# Patient Record
Sex: FEMALE | Race: WHITE | Employment: FULL TIME | URBAN - METROPOLITAN AREA
[De-identification: names, ages, dates, MRNs, and addresses within clinical notes are randomized per-mention and may not be internally consistent; named-entity substitution may affect disease eponyms.]

---

## 2017-04-06 ENCOUNTER — OFFICE VISIT (OUTPATIENT)
Dept: INTERNAL MEDICINE CLINIC | Age: 57
End: 2017-04-06
Payer: COMMERCIAL

## 2017-04-06 VITALS
WEIGHT: 233 LBS | DIASTOLIC BLOOD PRESSURE: 88 MMHG | BODY MASS INDEX: 37.45 KG/M2 | HEIGHT: 66 IN | SYSTOLIC BLOOD PRESSURE: 138 MMHG

## 2017-04-06 DIAGNOSIS — Z00.00 HEALTH CARE MAINTENANCE: ICD-10-CM

## 2017-04-06 DIAGNOSIS — Z12.39 BREAST CANCER SCREENING: ICD-10-CM

## 2017-04-06 DIAGNOSIS — R79.89 ELEVATED LFTS: ICD-10-CM

## 2017-04-06 DIAGNOSIS — Z83.438 FAMILY HISTORY OF HYPERLIPIDEMIA: ICD-10-CM

## 2017-04-06 DIAGNOSIS — R06.83 SNORING: Primary | ICD-10-CM

## 2017-04-06 DIAGNOSIS — E78.2 MIXED HYPERLIPIDEMIA: ICD-10-CM

## 2017-04-06 PROCEDURE — 99386 PREV VISIT NEW AGE 40-64: CPT | Performed by: INTERNAL MEDICINE

## 2017-04-19 ENCOUNTER — HOSPITAL ENCOUNTER (OUTPATIENT)
Dept: WOMENS IMAGING | Age: 57
Discharge: HOME | End: 2017-04-19
Payer: COMMERCIAL

## 2017-04-19 ENCOUNTER — OFFICE VISIT (OUTPATIENT)
Dept: INTERNAL MEDICINE CLINIC | Age: 57
End: 2017-04-19
Payer: COMMERCIAL

## 2017-04-19 ENCOUNTER — HOSPITAL ENCOUNTER (OUTPATIENT)
Age: 57
Setting detail: SPECIMEN
Discharge: HOME | End: 2017-04-19
Payer: COMMERCIAL

## 2017-04-19 VITALS
WEIGHT: 234 LBS | HEART RATE: 82 BPM | DIASTOLIC BLOOD PRESSURE: 76 MMHG | BODY MASS INDEX: 37.77 KG/M2 | SYSTOLIC BLOOD PRESSURE: 118 MMHG

## 2017-04-19 DIAGNOSIS — Z12.4 CERVICAL CANCER SCREENING: Primary | ICD-10-CM

## 2017-04-19 DIAGNOSIS — K76.89 LIVER CYST: ICD-10-CM

## 2017-04-19 DIAGNOSIS — Z12.39 BREAST CANCER SCREENING: ICD-10-CM

## 2017-04-19 PROCEDURE — 77063 BREAST TOMOSYNTHESIS BI: CPT

## 2017-04-19 PROCEDURE — 99396 PREV VISIT EST AGE 40-64: CPT | Performed by: NURSE PRACTITIONER

## 2017-04-24 LAB — CYTOLOGY REPORT: NORMAL

## 2017-05-25 ENCOUNTER — HOSPITAL ENCOUNTER (OUTPATIENT)
Age: 57
Setting detail: SPECIMEN
Discharge: HOME | End: 2017-05-25
Payer: COMMERCIAL

## 2017-05-25 DIAGNOSIS — Z83.438 FAMILY HISTORY OF HYPERLIPIDEMIA: ICD-10-CM

## 2017-05-25 DIAGNOSIS — Z00.00 HEALTH CARE MAINTENANCE: ICD-10-CM

## 2017-05-25 LAB
ABSOLUTE EOS #: 0.1 K/UL (ref 0–0.4)
ABSOLUTE LYMPH #: 2 K/UL (ref 1–4.8)
ABSOLUTE MONO #: 0.6 K/UL (ref 0.1–1.2)
ALBUMIN SERPL-MCNC: 4.3 G/DL (ref 3.5–5.2)
ALBUMIN/GLOBULIN RATIO: 1.4 (ref 1–2.5)
ALP BLD-CCNC: 88 U/L (ref 35–104)
ALT SERPL-CCNC: 56 U/L (ref 5–33)
ANION GAP SERPL CALCULATED.3IONS-SCNC: 14 MMOL/L (ref 9–17)
AST SERPL-CCNC: 42 U/L
BASOPHILS # BLD: 1 %
BASOPHILS ABSOLUTE: 0 K/UL (ref 0–0.2)
BILIRUB SERPL-MCNC: 0.56 MG/DL (ref 0.3–1.2)
BUN BLDV-MCNC: 19 MG/DL (ref 6–20)
BUN/CREAT BLD: ABNORMAL (ref 9–20)
CALCIUM SERPL-MCNC: 9.3 MG/DL (ref 8.6–10.4)
CHLORIDE BLD-SCNC: 102 MMOL/L (ref 98–107)
CHOLESTEROL/HDL RATIO: 4.8
CHOLESTEROL: 251 MG/DL
CO2: 27 MMOL/L (ref 20–31)
CREAT SERPL-MCNC: 0.78 MG/DL (ref 0.5–0.9)
DIFFERENTIAL TYPE: NORMAL
EOSINOPHILS RELATIVE PERCENT: 2 %
GFR AFRICAN AMERICAN: >60 ML/MIN
GFR NON-AFRICAN AMERICAN: >60 ML/MIN
GFR SERPL CREATININE-BSD FRML MDRD: ABNORMAL ML/MIN/{1.73_M2}
GFR SERPL CREATININE-BSD FRML MDRD: ABNORMAL ML/MIN/{1.73_M2}
GLUCOSE BLD-MCNC: 101 MG/DL (ref 70–99)
HCT VFR BLD CALC: 41.5 % (ref 36–46)
HDLC SERPL-MCNC: 52 MG/DL
HEMOGLOBIN: 14.1 G/DL (ref 12–16)
LDL CHOLESTEROL: 162 MG/DL (ref 0–130)
LYMPHOCYTES # BLD: 37 %
MCH RBC QN AUTO: 28.4 PG (ref 26–34)
MCHC RBC AUTO-ENTMCNC: 33.9 G/DL (ref 31–37)
MCV RBC AUTO: 83.9 FL (ref 80–100)
MONOCYTES # BLD: 12 %
PDW BLD-RTO: 14.2 % (ref 12.5–15.4)
PLATELET # BLD: 263 K/UL (ref 140–450)
PLATELET ESTIMATE: NORMAL
PMV BLD AUTO: 8.6 FL (ref 6–12)
POTASSIUM SERPL-SCNC: 3.8 MMOL/L (ref 3.7–5.3)
RBC # BLD: 4.95 M/UL (ref 4–5.2)
RBC # BLD: NORMAL 10*6/UL
SEG NEUTROPHILS: 48 %
SEGMENTED NEUTROPHILS ABSOLUTE COUNT: 2.6 K/UL (ref 1.8–7.7)
SODIUM BLD-SCNC: 143 MMOL/L (ref 135–144)
TOTAL PROTEIN: 7.3 G/DL (ref 6.4–8.3)
TRIGL SERPL-MCNC: 183 MG/DL
VLDLC SERPL CALC-MCNC: ABNORMAL MG/DL (ref 1–30)
WBC # BLD: 5.4 K/UL (ref 3.5–11)
WBC # BLD: NORMAL 10*3/UL

## 2017-05-25 RX ORDER — ATORVASTATIN CALCIUM 20 MG/1
20 TABLET, FILM COATED ORAL DAILY
Qty: 30 TABLET | Refills: 3 | Status: SHIPPED | OUTPATIENT
Start: 2017-05-25

## 2017-05-30 ENCOUNTER — HOSPITAL ENCOUNTER (OUTPATIENT)
Dept: ULTRASOUND IMAGING | Age: 57
Discharge: HOME | End: 2017-05-30
Payer: COMMERCIAL

## 2017-05-30 ENCOUNTER — HOSPITAL ENCOUNTER (OUTPATIENT)
Age: 57
Setting detail: SPECIMEN
Discharge: HOME | End: 2017-05-30
Payer: COMMERCIAL

## 2017-05-30 DIAGNOSIS — R79.89 ELEVATED LFTS: ICD-10-CM

## 2017-05-30 LAB
HBV SURFACE AB TITR SER: <3.5 MIU/ML
HEPATITIS B CORE TOTAL ANTIBODY: NONREACTIVE
HEPATITIS B SURFACE ANTIGEN: NONREACTIVE
HEPATITIS C ANTIBODY: NONREACTIVE

## 2017-05-30 PROCEDURE — 76705 ECHO EXAM OF ABDOMEN: CPT

## 2017-06-07 ENCOUNTER — HOSPITAL ENCOUNTER (OUTPATIENT)
Dept: MRI IMAGING | Age: 57
Discharge: HOME | End: 2017-06-07
Payer: COMMERCIAL

## 2017-06-07 DIAGNOSIS — K76.89 LIVER CYST: ICD-10-CM

## 2017-06-07 PROCEDURE — 74183 MRI ABD W/O CNTR FLWD CNTR: CPT

## 2017-06-07 PROCEDURE — A9579 GAD-BASE MR CONTRAST NOS,1ML: HCPCS | Performed by: INTERNAL MEDICINE

## 2017-06-07 PROCEDURE — 6360000004 HC RX CONTRAST MEDICATION: Performed by: INTERNAL MEDICINE

## 2017-06-07 RX ADMIN — GADOPENTETATE DIMEGLUMINE 20 ML: 469.01 INJECTION INTRAVENOUS at 09:13

## 2017-06-13 ENCOUNTER — HOSPITAL ENCOUNTER (OUTPATIENT)
Dept: SLEEP CENTER | Age: 57
Discharge: HOME | End: 2017-06-13
Payer: COMMERCIAL

## 2017-06-13 VITALS
SYSTOLIC BLOOD PRESSURE: 138 MMHG | DIASTOLIC BLOOD PRESSURE: 88 MMHG | WEIGHT: 233 LBS | HEART RATE: 80 BPM | RESPIRATION RATE: 16 BRPM | HEIGHT: 66 IN | BODY MASS INDEX: 37.45 KG/M2

## 2017-06-13 DIAGNOSIS — G47.33 OSA (OBSTRUCTIVE SLEEP APNEA): Primary | ICD-10-CM

## 2017-06-13 PROCEDURE — 95810 POLYSOM 6/> YRS 4/> PARAM: CPT

## 2017-06-14 ASSESSMENT — SLEEP AND FATIGUE QUESTIONNAIRES
HOW LIKELY ARE YOU TO NOD OFF OR FALL ASLEEP WHILE LYING DOWN TO REST IN THE AFTERNOON WHEN CIRCUMSTANCES PERMIT: 1
HOW LIKELY ARE YOU TO NOD OFF OR FALL ASLEEP WHILE SITTING INACTIVE IN A PUBLIC PLACE: 0
HOW LIKELY ARE YOU TO NOD OFF OR FALL ASLEEP WHILE WATCHING TV: 0
ESS TOTAL SCORE: 2
HOW LIKELY ARE YOU TO NOD OFF OR FALL ASLEEP WHILE SITTING AND READING: 0
HOW LIKELY ARE YOU TO NOD OFF OR FALL ASLEEP IN A CAR, WHILE STOPPED FOR A FEW MINUTES IN TRAFFIC: 0
NECK CIRCUMFERENCE (INCHES): 14.96
HOW LIKELY ARE YOU TO NOD OFF OR FALL ASLEEP WHILE SITTING AND TALKING TO SOMEONE: 0
HOW LIKELY ARE YOU TO NOD OFF OR FALL ASLEEP WHILE SITTING QUIETLY AFTER LUNCH WITHOUT ALCOHOL: 0
HOW LIKELY ARE YOU TO NOD OFF OR FALL ASLEEP WHEN YOU ARE A PASSENGER IN A CAR FOR AN HOUR WITHOUT A BREAK: 1

## 2017-06-16 ENCOUNTER — TELEPHONE (OUTPATIENT)
Dept: INTERNAL MEDICINE CLINIC | Age: 57
End: 2017-06-16

## 2017-06-19 ENCOUNTER — TELEPHONE (OUTPATIENT)
Dept: INTERNAL MEDICINE CLINIC | Age: 57
End: 2017-06-19

## 2017-06-19 DIAGNOSIS — R06.83 SNORING: ICD-10-CM

## 2017-06-19 DIAGNOSIS — R06.83 SNORING: Primary | ICD-10-CM

## 2017-06-27 ENCOUNTER — HOSPITAL ENCOUNTER (OUTPATIENT)
Dept: SLEEP CENTER | Age: 57
Discharge: HOME | End: 2017-06-27
Payer: COMMERCIAL

## 2017-06-27 DIAGNOSIS — G47.33 OSA (OBSTRUCTIVE SLEEP APNEA): Primary | ICD-10-CM

## 2017-06-27 PROCEDURE — 95811 POLYSOM 6/>YRS CPAP 4/> PARM: CPT

## 2017-07-27 DIAGNOSIS — R06.83 SNORING: ICD-10-CM

## 2021-02-17 ENCOUNTER — OFFICE VISIT (OUTPATIENT)
Dept: INTERNAL MEDICINE CLINIC | Age: 61
End: 2021-02-17
Payer: COMMERCIAL

## 2021-02-17 ENCOUNTER — HOSPITAL ENCOUNTER (OUTPATIENT)
Age: 61
Setting detail: SPECIMEN
Discharge: HOME | End: 2021-02-17
Payer: COMMERCIAL

## 2021-02-17 VITALS
DIASTOLIC BLOOD PRESSURE: 82 MMHG | HEART RATE: 75 BPM | HEIGHT: 66 IN | OXYGEN SATURATION: 98 % | SYSTOLIC BLOOD PRESSURE: 138 MMHG | TEMPERATURE: 97.1 F | WEIGHT: 230 LBS | BODY MASS INDEX: 36.96 KG/M2

## 2021-02-17 DIAGNOSIS — Z12.31 ENCOUNTER FOR SCREENING MAMMOGRAM FOR BREAST CANCER: ICD-10-CM

## 2021-02-17 DIAGNOSIS — E66.09 CLASS 2 OBESITY DUE TO EXCESS CALORIES WITHOUT SERIOUS COMORBIDITY WITH BODY MASS INDEX (BMI) OF 37.0 TO 37.9 IN ADULT: ICD-10-CM

## 2021-02-17 DIAGNOSIS — Z00.00 ENCOUNTER FOR MEDICAL EXAMINATION TO ESTABLISH CARE: ICD-10-CM

## 2021-02-17 DIAGNOSIS — Z13.220 SCREENING FOR HYPERLIPIDEMIA: ICD-10-CM

## 2021-02-17 DIAGNOSIS — E55.9 VITAMIN D DEFICIENCY: ICD-10-CM

## 2021-02-17 DIAGNOSIS — Z12.11 SCREENING FOR COLON CANCER: Primary | ICD-10-CM

## 2021-02-17 PROBLEM — E66.812 CLASS 2 OBESITY DUE TO EXCESS CALORIES WITHOUT SERIOUS COMORBIDITY WITH BODY MASS INDEX (BMI) OF 37.0 TO 37.9 IN ADULT: Status: ACTIVE | Noted: 2021-02-17

## 2021-02-17 LAB
ABSOLUTE EOS #: 0.05 K/UL (ref 0–0.44)
ABSOLUTE IMMATURE GRANULOCYTE: <0.03 K/UL (ref 0–0.3)
ABSOLUTE LYMPH #: 1.9 K/UL (ref 1.1–3.7)
ABSOLUTE MONO #: 0.48 K/UL (ref 0.1–1.2)
ALBUMIN SERPL-MCNC: 4.5 G/DL (ref 3.5–5.2)
ALBUMIN/GLOBULIN RATIO: 1.8 (ref 1–2.5)
ALP BLD-CCNC: 73 U/L (ref 35–104)
ALT SERPL-CCNC: 49 U/L (ref 5–33)
ANION GAP SERPL CALCULATED.3IONS-SCNC: 12 MMOL/L (ref 9–17)
AST SERPL-CCNC: 29 U/L
BASOPHILS # BLD: 0 % (ref 0–2)
BASOPHILS ABSOLUTE: <0.03 K/UL (ref 0–0.2)
BILIRUB SERPL-MCNC: 0.42 MG/DL (ref 0.3–1.2)
BILIRUBIN URINE: NEGATIVE
BUN BLDV-MCNC: 18 MG/DL (ref 8–23)
BUN/CREAT BLD: ABNORMAL (ref 9–20)
CALCIUM SERPL-MCNC: 10.3 MG/DL (ref 8.6–10.4)
CHLORIDE BLD-SCNC: 104 MMOL/L (ref 98–107)
CHOLESTEROL, FASTING: 234 MG/DL
CHOLESTEROL/HDL RATIO: 4.5
CO2: 26 MMOL/L (ref 20–31)
COLOR: YELLOW
COMMENT UA: NORMAL
CREAT SERPL-MCNC: 0.9 MG/DL (ref 0.5–0.9)
DIFFERENTIAL TYPE: NORMAL
EOSINOPHILS RELATIVE PERCENT: 1 % (ref 1–4)
GFR AFRICAN AMERICAN: >60 ML/MIN
GFR NON-AFRICAN AMERICAN: >60 ML/MIN
GFR SERPL CREATININE-BSD FRML MDRD: ABNORMAL ML/MIN/{1.73_M2}
GFR SERPL CREATININE-BSD FRML MDRD: ABNORMAL ML/MIN/{1.73_M2}
GLUCOSE BLD-MCNC: 100 MG/DL (ref 70–99)
GLUCOSE URINE: NEGATIVE
HCT VFR BLD CALC: 42.4 % (ref 36.3–47.1)
HDLC SERPL-MCNC: 52 MG/DL
HEMOGLOBIN: 13.8 G/DL (ref 11.9–15.1)
IMMATURE GRANULOCYTES: 0 %
KETONES, URINE: NEGATIVE
LDL CHOLESTEROL: 154 MG/DL (ref 0–130)
LEUKOCYTE ESTERASE, URINE: NEGATIVE
LYMPHOCYTES # BLD: 35 % (ref 24–43)
MCH RBC QN AUTO: 27.9 PG (ref 25.2–33.5)
MCHC RBC AUTO-ENTMCNC: 32.5 G/DL (ref 28.4–34.8)
MCV RBC AUTO: 85.7 FL (ref 82.6–102.9)
MONOCYTES # BLD: 9 % (ref 3–12)
NITRITE, URINE: NEGATIVE
NRBC AUTOMATED: 0 PER 100 WBC
PDW BLD-RTO: 13.2 % (ref 11.8–14.4)
PH UA: 5 (ref 5–8)
PLATELET # BLD: 263 K/UL (ref 138–453)
PLATELET ESTIMATE: NORMAL
PMV BLD AUTO: 11 FL (ref 8.1–13.5)
POTASSIUM SERPL-SCNC: 4.1 MMOL/L (ref 3.7–5.3)
PROTEIN UA: NEGATIVE
RBC # BLD: 4.95 M/UL (ref 3.95–5.11)
RBC # BLD: NORMAL 10*6/UL
SEG NEUTROPHILS: 55 % (ref 36–65)
SEGMENTED NEUTROPHILS ABSOLUTE COUNT: 2.9 K/UL (ref 1.5–8.1)
SODIUM BLD-SCNC: 142 MMOL/L (ref 135–144)
SPECIFIC GRAVITY UA: 1.01 (ref 1–1.03)
TOTAL PROTEIN: 7 G/DL (ref 6.4–8.3)
TRIGLYCERIDE, FASTING: 140 MG/DL
TSH SERPL DL<=0.05 MIU/L-ACNC: 2.17 MIU/L (ref 0.3–5)
TURBIDITY: CLEAR
URINE HGB: NEGATIVE
UROBILINOGEN, URINE: NORMAL
VITAMIN D 25-HYDROXY: 47.3 NG/ML (ref 30–100)
VLDLC SERPL CALC-MCNC: ABNORMAL MG/DL (ref 1–30)
WBC # BLD: 5.4 K/UL (ref 3.5–11.3)
WBC # BLD: NORMAL 10*3/UL

## 2021-02-17 PROCEDURE — 99203 OFFICE O/P NEW LOW 30 MIN: CPT | Performed by: NURSE PRACTITIONER

## 2021-02-17 ASSESSMENT — ENCOUNTER SYMPTOMS
COUGH: 0
CHEST TIGHTNESS: 0
DIARRHEA: 0
CONSTIPATION: 0
RHINORRHEA: 0
VOMITING: 0
NAUSEA: 0
WHEEZING: 0
TROUBLE SWALLOWING: 0
COLOR CHANGE: 0
SHORTNESS OF BREATH: 0
SORE THROAT: 0
ABDOMINAL PAIN: 0

## 2021-02-17 ASSESSMENT — PATIENT HEALTH QUESTIONNAIRE - PHQ9
SUM OF ALL RESPONSES TO PHQ QUESTIONS 1-9: 0
2. FEELING DOWN, DEPRESSED OR HOPELESS: 0
SUM OF ALL RESPONSES TO PHQ QUESTIONS 1-9: 0
SUM OF ALL RESPONSES TO PHQ QUESTIONS 1-9: 0

## 2021-02-17 NOTE — PROGRESS NOTES
Talks on phone: Not on file     Gets together: Not on file     Attends Zoroastrianism service: Not on file     Active member of club or organization: Not on file     Attends meetings of clubs or organizations: Not on file     Relationship status: Not on file    Intimate partner violence     Fear of current or ex partner: Not on file     Emotionally abused: Not on file     Physically abused: Not on file     Forced sexual activity: Not on file   Other Topics Concern    Not on file   Social History Narrative    Not on file         REVIEW OF SYSTEMS:   Review of Systems   Constitutional: Negative for chills, diaphoresis, fatigue, fever and unexpected weight change. HENT: Negative for congestion, postnasal drip, rhinorrhea, sneezing, sore throat and trouble swallowing. Eyes: Negative for visual disturbance. Respiratory: Negative for cough, chest tightness, shortness of breath and wheezing. Cardiovascular: Negative for chest pain. Gastrointestinal: Negative for abdominal pain, constipation, diarrhea, nausea and vomiting. Endocrine: Negative for polydipsia, polyphagia and polyuria. Genitourinary: Negative for difficulty urinating, dysuria, flank pain, frequency and urgency. Musculoskeletal: Negative for arthralgias. Skin: Negative for color change and rash. Neurological: Negative for dizziness, tremors, syncope, weakness and numbness. Psychiatric/Behavioral: Negative for confusion, hallucinations, self-injury, sleep disturbance and suicidal ideas. The patient is not nervous/anxious. PHYSICAL EXAM:      Vitals:    02/17/21 0935 02/17/21 1007   BP: (!) 146/82 138/82   Site: Right Upper Arm Left Upper Arm   Position: Sitting    Pulse: 75    Temp: 97.1 °F (36.2 °C)    SpO2: 98%    Weight: 230 lb (104.3 kg)    Height: 5' 6\" (1.676 m)        Physical Exam  Vitals signs reviewed. Constitutional:       Appearance: Normal appearance. She is obese. HENT:      Head: Normocephalic. Cardiovascular:      Rate and Rhythm: Normal rate and regular rhythm. Pulses: Normal pulses. Heart sounds: Normal heart sounds. Pulmonary:      Effort: Pulmonary effort is normal.      Breath sounds: Normal breath sounds. Abdominal:      General: Bowel sounds are normal.      Palpations: Abdomen is soft. Musculoskeletal: Normal range of motion. General: No swelling, tenderness or deformity. Skin:     General: Skin is warm and dry. Neurological:      General: No focal deficit present. Mental Status: She is alert and oriented to person, place, and time. Psychiatric:         Mood and Affect: Mood normal.         Behavior: Behavior normal.         Thought Content: Thought content normal.         Judgment: Judgment normal.          LABORATORY FINDINGS:    CBC:   Lab Results   Component Value Date    WBC 5.4 05/25/2017    HGB 14.1 05/25/2017     05/25/2017     BMP:    Lab Results   Component Value Date     05/25/2017    K 3.8 05/25/2017     05/25/2017    CO2 27 05/25/2017    BUN 19 05/25/2017    CREATININE 0.78 05/25/2017    GLUCOSE 101 05/25/2017     Hemoglobin A1C: No results found for: LABA1C  Lipid profile:   Lab Results   Component Value Date    CHOL 251 05/25/2017    TRIG 183 05/25/2017    HDL 52 05/25/2017     Thyroid functions: No results found for: TSH   Hepatic functions:   Lab Results   Component Value Date    ALT 56 05/25/2017    AST 42 05/25/2017    PROT 7.3 05/25/2017    BILITOT 0.56 05/25/2017    LABALBU 4.3 05/25/2017       ASSESSMENT AND PLAN:     1. Encounter for medical examination to establish care  - CBC With Auto Differential; Future  - Cologuard (For External Results Only); Future  - Comprehensive Metabolic Panel; Future  - SHAYLA Digital Screen Bilateral [id number]; Future  - Lipid, Fasting; Future  - TSH without Reflex; Future  - Urinalysis; Future    2.  Encounter for screening mammogram for breast cancer

## 2021-02-19 ENCOUNTER — HOSPITAL ENCOUNTER (OUTPATIENT)
Dept: WOMENS IMAGING | Age: 61
Discharge: HOME | End: 2021-02-21
Payer: COMMERCIAL

## 2021-02-19 DIAGNOSIS — Z12.31 ENCOUNTER FOR SCREENING MAMMOGRAM FOR BREAST CANCER: ICD-10-CM

## 2021-02-19 DIAGNOSIS — Z00.00 ENCOUNTER FOR MEDICAL EXAMINATION TO ESTABLISH CARE: ICD-10-CM

## 2021-02-19 PROCEDURE — 77063 BREAST TOMOSYNTHESIS BI: CPT

## 2021-02-22 DIAGNOSIS — R92.8 ABNORMAL MAMMOGRAM: Primary | ICD-10-CM

## 2021-02-25 ENCOUNTER — HOSPITAL ENCOUNTER (OUTPATIENT)
Age: 61
Setting detail: SPECIMEN
Discharge: HOME | End: 2021-02-25
Payer: COMMERCIAL

## 2021-02-25 ENCOUNTER — OFFICE VISIT (OUTPATIENT)
Dept: INTERNAL MEDICINE CLINIC | Age: 61
End: 2021-02-25
Payer: COMMERCIAL

## 2021-02-25 VITALS
WEIGHT: 224 LBS | OXYGEN SATURATION: 98 % | DIASTOLIC BLOOD PRESSURE: 80 MMHG | BODY MASS INDEX: 36 KG/M2 | HEART RATE: 89 BPM | SYSTOLIC BLOOD PRESSURE: 132 MMHG | TEMPERATURE: 97.4 F | HEIGHT: 66 IN

## 2021-02-25 DIAGNOSIS — Z01.419 WELL WOMAN EXAM WITH ROUTINE GYNECOLOGICAL EXAM: Primary | ICD-10-CM

## 2021-02-25 DIAGNOSIS — Z12.4 CERVICAL CANCER SCREENING: ICD-10-CM

## 2021-02-25 PROCEDURE — 99213 OFFICE O/P EST LOW 20 MIN: CPT | Performed by: NURSE PRACTITIONER

## 2021-02-25 ASSESSMENT — ENCOUNTER SYMPTOMS
TROUBLE SWALLOWING: 0
ABDOMINAL PAIN: 0
SORE THROAT: 0
COUGH: 0
VOMITING: 0
WHEEZING: 0
SHORTNESS OF BREATH: 0
CHEST TIGHTNESS: 0
COLOR CHANGE: 0
CONSTIPATION: 0
DIARRHEA: 0
RHINORRHEA: 0
NAUSEA: 0

## 2021-02-25 NOTE — PROGRESS NOTES
Visit Information    Have you changed or started any medications since your last visit including any over-the-counter medicines, vitamins, or herbal medicines? no   Are you having any side effects from any of your medications? -  no  Have you stopped taking any of your medications? Is so, why? -  no    Have you seen any other physician or provider since your last visit? Yes - Records Obtained  Have you had any other diagnostic tests since your last visit? Yes - Records Obtained  Have you been seen in the emergency room and/or had an admission to a hospital since we last saw you? No  Have you had your routine dental cleaning in the past 6 months? yes -     Have you activated your [employer] account? If not, what are your barriers?  Yes     Patient Care Team:  COY Viveros CNP as PCP - General (Nurse Practitioner)  COY Viveros CNP as PCP - [facility]    Medical History Review  Past Medical, Family, and Social History reviewed and does not contribute to the patient presenting condition    Health Maintenance   Topic Date Due    DTaP/Tdap/Td vaccine (1 - Tdap) 02/25/1978    Colon cancer screen colonoscopy  02/25/2009    Cervical cancer screen  04/19/2020    Lipid screen  02/17/2022    Breast cancer screen  02/19/2023    Flu vaccine  Completed    Shingles Vaccine  Completed    Hepatitis C screen  Completed    Hepatitis A vaccine  Aged Out    Hepatitis B vaccine  Aged Out    Hib vaccine  Aged Out    Meningococcal (ACWY) vaccine  Aged Out    Pneumococcal 0-64 years Vaccine  Aged Out    HIV screen  Discontinued         [address] [address]  Dept: [phone]  Dept Fax: [phone]    OfficeProgress/Follow Up Note  Date of patient's visit: 2/25/2021  Patient's Name:  Andreia Han YOB: 1960            Patient Care Team:  COY Viveros CNP as PCP - General (Nurse Practitioner) COY Pastor - CNP as PCP - [facility] Empaneled Provider    REASON FOR VISIT:Same day visit    HISTORY OF PRESENT ILLNESS:      Chief Complaint   Patient presents with    Gynecologic Exam       History was obtained from the patient. Jennifer Guardado is a 57 y.o. female here today for well woman exam. No complaints at this time. Patient Active Problem List   Diagnosis    Class 2 obesity due to excess calories without serious comorbidity with body mass index (BMI) of 37.0 to 37.9 in adult       MEDICATIONS:      Current Outpatient Medications   Medication Sig Dispense Refill    atorvastatin (LIPITOR) 20 MG tablet Take 1 tablet by mouth daily (Patient not taking: Reported on 2/17/2021) 30 tablet 3     No current facility-administered medications for this visit. ALLERGIES:    No Known Allergies    SOCIAL HISTORY   Reviewed and no change from previous record. Vedia Curling  reports that she has never smoked. She has never used smokeless tobacco.    FAMILY HISTORY:    Reviewed and No change from previous visit    REVIEW OF SYSTEMS:    Review of Systems   Constitutional: Negative for chills, diaphoresis, fatigue, fever and unexpected weight change. HENT: Negative for congestion, postnasal drip, rhinorrhea, sneezing, sore throat and trouble swallowing. Eyes: Negative for visual disturbance. Respiratory: Negative for cough, chest tightness, shortness of breath and wheezing. Cardiovascular: Negative for chest pain. Gastrointestinal: Negative for abdominal pain, constipation, diarrhea, nausea and vomiting. Endocrine: Negative for polydipsia, polyphagia and polyuria. Genitourinary: Negative for decreased urine volume, difficulty urinating, dyspareunia, dysuria, flank pain, frequency, hematuria, pelvic pain, urgency, vaginal bleeding, vaginal discharge and vaginal pain. Musculoskeletal: Negative for arthralgias. Skin: Negative for color change and rash. Neurological: Negative for dizziness, tremors, syncope, weakness and numbness. Psychiatric/Behavioral: Negative for confusion and hallucinations. PHYSICAL EXAM:      Vitals:    02/25/21 1119   BP: 132/80   Pulse: 89   Temp: 97.4 °F (36.3 °C)   SpO2: 98%   Weight: 224 lb (101.6 kg)   Height: 5' 6\" (1.676 m)       Physical Exam  Vitals signs reviewed. Exam conducted with a chaperone present. Constitutional:       Appearance: Normal appearance. HENT:      Head: Normocephalic. Cardiovascular:      Rate and Rhythm: Normal rate and regular rhythm. Pulses: Normal pulses. Heart sounds: Normal heart sounds. Pulmonary:      Effort: Pulmonary effort is normal.      Breath sounds: Normal breath sounds. Chest:      Chest wall: No mass, lacerations, deformity, swelling, tenderness, crepitus or edema. Breasts: Breasts are symmetrical.         Right: Normal.         Left: Normal.   Abdominal:      Hernia: There is no hernia in the left inguinal area or right inguinal area. Genitourinary:     General: Normal vulva. Exam position: Lithotomy position. Pubic Area: No rash or pubic lice. Labia:         Right: No rash, tenderness, lesion or injury. Left: No rash, tenderness, lesion or injury. Urethra: No prolapse, urethral pain, urethral swelling or urethral lesion. Vagina: Normal.      Cervix: Normal.      Uterus: Normal.       Adnexa: Right adnexa normal and left adnexa normal.   Musculoskeletal: Normal range of motion. Lymphadenopathy:      Upper Body:      Right upper body: No supraclavicular, axillary or pectoral adenopathy. Left upper body: No supraclavicular, axillary or pectoral adenopathy. Lower Body: No right inguinal adenopathy. No left inguinal adenopathy. Skin:     General: Skin is warm and dry. Neurological:      General: No focal deficit present. Mental Status: She is alert and oriented to person, place, and time. Psychiatric:         Mood and Affect: Mood normal.         Behavior: Behavior normal.         Thought Content: Thought content normal.         Judgment: Judgment normal.          ASSESSMENT AND PLAN:      1. Well woman exam with routine gynecological exam  -PAP smear  - additional diagnostic mammogram and ultrasound scheduled for 3/1/21 due to patients dense breast tissue. 2. Cervical cancer screening  - PAP Smear; Future      FOLLOW UP AND INSTRUCTIONS:   No follow-ups on file. Discussed use, benefit, and side effects of prescribed medications. All patient questions answered. Patient voiced understanding. Patient given educational materials - see patient instructions    COY Rojas - CNP   ELYSIA. [facility]  2/25/2021, 1:24 PM    Please note that this chart wasgenerated using voice recognition Dragon dictation software. Although every effort was made to ensure the accuracy of this automated transcription, some errors in transcription may have occurred.

## 2021-02-27 LAB
HPV SAMPLE: NORMAL
HPV, GENOTYPE 16: NOT DETECTED
HPV, GENOTYPE 18: NOT DETECTED
HPV, HIGH RISK OTHER: NOT DETECTED
HPV, INTERPRETATION: NORMAL
SPECIMEN DESCRIPTION: NORMAL

## 2021-03-01 ENCOUNTER — HOSPITAL ENCOUNTER (OUTPATIENT)
Dept: WOMENS IMAGING | Age: 61
Discharge: HOME | End: 2021-03-03
Payer: COMMERCIAL

## 2021-03-01 DIAGNOSIS — R92.8 ABNORMAL MAMMOGRAM: ICD-10-CM

## 2021-03-01 DIAGNOSIS — Z12.11 SCREENING FOR COLON CANCER: ICD-10-CM

## 2021-03-01 DIAGNOSIS — Z00.00 ENCOUNTER FOR MEDICAL EXAMINATION TO ESTABLISH CARE: ICD-10-CM

## 2021-03-01 PROCEDURE — 77065 DX MAMMO INCL CAD UNI: CPT

## 2021-03-05 LAB — CYTOLOGY REPORT: NORMAL

## 2021-04-29 ENCOUNTER — TELEPHONE (OUTPATIENT)
Dept: INTERNAL MEDICINE CLINIC | Age: 61
End: 2021-04-29

## 2021-04-29 NOTE — TELEPHONE ENCOUNTER
Pt sent EOB's from her visit on 2/15/21 and also on 2/25/21 to writers e-mail asking if the coding for the visits could be reviewed. Writer forwarded the message to Carlos Elmore for her to investigate and respond back to the pt.

## 2021-04-30 NOTE — TELEPHONE ENCOUNTER
Spoke with patient there is no coding issue, issue is that insurance is not cover general exam. Patient stated she will call and speak with insurance and give an updated if we can help.

## 2023-01-09 ENCOUNTER — TELEPHONE (OUTPATIENT)
Dept: BARIATRICS/WEIGHT MGMT | Age: 63
End: 2023-01-09

## 2023-01-09 NOTE — TELEPHONE ENCOUNTER
Online Info Session Completed:  on 12/27/2022 okay to schedule with Dr. Anni Kuhn   with [address]     Patient informed the following: excluded benefit. Yves Benitez

## 2024-02-06 ENCOUNTER — TELEPHONE (OUTPATIENT)
Dept: INTERNAL MEDICINE CLINIC | Age: 64
End: 2024-02-06

## 2024-02-06 ENCOUNTER — HOSPITAL ENCOUNTER (OUTPATIENT)
Age: 64
Setting detail: SPECIMEN
Discharge: HOME | End: 2024-02-06

## 2024-02-06 ENCOUNTER — OFFICE VISIT (OUTPATIENT)
Dept: INTERNAL MEDICINE CLINIC | Age: 64
End: 2024-02-06
Payer: MEDICARE

## 2024-02-06 VITALS
SYSTOLIC BLOOD PRESSURE: 136 MMHG | BODY MASS INDEX: 34.23 KG/M2 | HEART RATE: 63 BPM | OXYGEN SATURATION: 97 % | HEIGHT: 66 IN | DIASTOLIC BLOOD PRESSURE: 98 MMHG | WEIGHT: 213 LBS

## 2024-02-06 DIAGNOSIS — R73.09 ELEVATED GLUCOSE: ICD-10-CM

## 2024-02-06 DIAGNOSIS — Z12.31 ENCOUNTER FOR SCREENING MAMMOGRAM FOR BREAST CANCER: ICD-10-CM

## 2024-02-06 DIAGNOSIS — Z13.29 SCREENING FOR ENDOCRINE DISORDER: ICD-10-CM

## 2024-02-06 DIAGNOSIS — R03.0 ELEVATED BP WITHOUT DIAGNOSIS OF HYPERTENSION: ICD-10-CM

## 2024-02-06 DIAGNOSIS — N95.0 POSTMENOPAUSAL BLEEDING: ICD-10-CM

## 2024-02-06 DIAGNOSIS — Z13.5 GLAUCOMA SCREENING: ICD-10-CM

## 2024-02-06 DIAGNOSIS — Z13.220 SCREENING FOR HYPERLIPIDEMIA: ICD-10-CM

## 2024-02-06 DIAGNOSIS — Z76.89 ENCOUNTER TO ESTABLISH CARE: Primary | ICD-10-CM

## 2024-02-06 DIAGNOSIS — Z01.00 VISION TEST: Primary | ICD-10-CM

## 2024-02-06 DIAGNOSIS — Z12.11 SCREENING FOR MALIGNANT NEOPLASM OF COLON: ICD-10-CM

## 2024-02-06 LAB
ALBUMIN SERPL-MCNC: 4.4 G/DL (ref 3.5–5.2)
ALBUMIN/GLOB SERPL: 1.8 {RATIO} (ref 1–2.5)
ALP SERPL-CCNC: 98 U/L (ref 35–104)
ALT SERPL-CCNC: 40 U/L (ref 5–33)
ANION GAP SERPL CALCULATED.3IONS-SCNC: 12 MMOL/L (ref 9–17)
AST SERPL-CCNC: 30 U/L
BACTERIA URNS QL MICRO: NORMAL
BASOPHILS # BLD: 0.04 K/UL (ref 0–0.2)
BASOPHILS NFR BLD: 1 % (ref 0–2)
BILIRUB SERPL-MCNC: 0.6 MG/DL (ref 0.3–1.2)
BILIRUB UR QL STRIP: NEGATIVE
BUN SERPL-MCNC: 18 MG/DL (ref 8–23)
CALCIUM SERPL-MCNC: 9.8 MG/DL (ref 8.6–10.4)
CASTS #/AREA URNS LPF: NORMAL /LPF (ref 0–8)
CHLORIDE SERPL-SCNC: 106 MMOL/L (ref 98–107)
CHOLEST SERPL-MCNC: 245 MG/DL
CHOLESTEROL/HDL RATIO: 4.4
CLARITY UR: CLEAR
CO2 SERPL-SCNC: 26 MMOL/L (ref 20–31)
COLOR UR: YELLOW
CREAT SERPL-MCNC: 0.8 MG/DL (ref 0.5–0.9)
EOSINOPHIL # BLD: 0.09 K/UL (ref 0–0.44)
EOSINOPHILS RELATIVE PERCENT: 1 % (ref 1–4)
EPI CELLS #/AREA URNS HPF: NORMAL /HPF (ref 0–5)
ERYTHROCYTE [DISTWIDTH] IN BLOOD BY AUTOMATED COUNT: 13.9 % (ref 11.8–14.4)
EST. AVERAGE GLUCOSE BLD GHB EST-MCNC: 105 MG/DL
GFR SERPL CREATININE-BSD FRML MDRD: >60 ML/MIN/1.73M2
GLUCOSE SERPL-MCNC: 85 MG/DL (ref 70–99)
GLUCOSE UR STRIP-MCNC: NEGATIVE MG/DL
HBA1C MFR BLD: 5.3 % (ref 4–6)
HCT VFR BLD AUTO: 41.9 % (ref 36.3–47.1)
HDLC SERPL-MCNC: 56 MG/DL
HGB BLD-MCNC: 13.5 G/DL (ref 11.9–15.1)
HGB UR QL STRIP.AUTO: ABNORMAL
IMM GRANULOCYTES # BLD AUTO: <0.03 K/UL (ref 0–0.3)
IMM GRANULOCYTES NFR BLD: 0 %
KETONES UR STRIP-MCNC: NEGATIVE MG/DL
LDLC SERPL CALC-MCNC: 165 MG/DL (ref 0–130)
LEUKOCYTE ESTERASE UR QL STRIP: ABNORMAL
LYMPHOCYTES NFR BLD: 2.01 K/UL (ref 1.1–3.7)
LYMPHOCYTES RELATIVE PERCENT: 29 % (ref 24–43)
MCH RBC QN AUTO: 28.2 PG (ref 25.2–33.5)
MCHC RBC AUTO-ENTMCNC: 32.2 G/DL (ref 28.4–34.8)
MCV RBC AUTO: 87.7 FL (ref 82.6–102.9)
MONOCYTES NFR BLD: 0.74 K/UL (ref 0.1–1.2)
MONOCYTES NFR BLD: 11 % (ref 3–12)
NEUTROPHILS NFR BLD: 58 % (ref 36–65)
NEUTS SEG NFR BLD: 4.09 K/UL (ref 1.5–8.1)
NITRITE UR QL STRIP: NEGATIVE
NRBC BLD-RTO: 0 PER 100 WBC
PH UR STRIP: 5.5 [PH] (ref 5–8)
PLATELET # BLD AUTO: 269 K/UL (ref 138–453)
PMV BLD AUTO: 10 FL (ref 8.1–13.5)
POTASSIUM SERPL-SCNC: 4.4 MMOL/L (ref 3.7–5.3)
PROT SERPL-MCNC: 6.8 G/DL (ref 6.4–8.3)
PROT UR STRIP-MCNC: NEGATIVE MG/DL
RBC # BLD AUTO: 4.78 M/UL (ref 3.95–5.11)
RBC #/AREA URNS HPF: NORMAL /HPF (ref 0–4)
SODIUM SERPL-SCNC: 144 MMOL/L (ref 135–144)
SP GR UR STRIP: 1.01 (ref 1–1.03)
TRIGL SERPL-MCNC: 118 MG/DL
TSH SERPL DL<=0.05 MIU/L-ACNC: 1.62 UIU/ML (ref 0.3–5)
UROBILINOGEN UR STRIP-ACNC: NORMAL EU/DL (ref 0–1)
WBC #/AREA URNS HPF: NORMAL /HPF (ref 0–5)
WBC OTHER # BLD: 7 K/UL (ref 3.5–11.3)

## 2024-02-06 PROCEDURE — G8427 DOCREV CUR MEDS BY ELIG CLIN: HCPCS | Performed by: NURSE PRACTITIONER

## 2024-02-06 PROCEDURE — 1036F TOBACCO NON-USER: CPT | Performed by: NURSE PRACTITIONER

## 2024-02-06 PROCEDURE — G8484 FLU IMMUNIZE NO ADMIN: HCPCS | Performed by: NURSE PRACTITIONER

## 2024-02-06 PROCEDURE — 3017F COLORECTAL CA SCREEN DOC REV: CPT | Performed by: NURSE PRACTITIONER

## 2024-02-06 PROCEDURE — 99204 OFFICE O/P NEW MOD 45 MIN: CPT | Performed by: NURSE PRACTITIONER

## 2024-02-06 PROCEDURE — G8417 CALC BMI ABV UP PARAM F/U: HCPCS | Performed by: NURSE PRACTITIONER

## 2024-02-06 RX ORDER — M-VIT,TX,IRON,MINS/CALC/FOLIC 27MG-0.4MG
1 TABLET ORAL DAILY
COMMUNITY

## 2024-02-06 NOTE — TELEPHONE ENCOUNTER
Patient called to schedule routine eye exam. She was advised to obtain a referral from PCP.     Referral pending, please add diagnosis and sign.

## 2024-02-06 NOTE — PROGRESS NOTES
Visit Information    Have you changed or started any medications since your last visit including any over-the-counter medicines, vitamins, or herbal medicines? no   Are you having any side effects from any of your medications? -  no  Have you stopped taking any of your medications? Is so, why? -  no    Have you seen any other physician or provider since your last visit? No  Have you had any other diagnostic tests since your last visit? No  Have you been seen in the emergency room and/or had an admission to a hospital since we last saw you? No  Have you had your routine dental cleaning in the past 6 months? no    Have you activated your [employer] account? If not, what are your barriers? Yes     Patient Care Team:  Anca Rangel APRN - CNP as PCP - General (Internal Medicine)  Anca Rangel APRN - CNP as PCP - Empaneled Provider    Medical History Review  Past Medical, Family, and Social History reviewed and does contribute to the patient presenting condition    Health Maintenance   Topic Date Due    DTaP/Tdap/Td vaccine (1 - Tdap) Never done    Respiratory Syncytial Virus (RSV) Pregnant or age 60 yrs+ (1 - 1-dose 60+ series) Never done    Flu vaccine (1) 08/01/2023    COVID-19 Vaccine (3 - 2023-24 season) 09/01/2023    Annual Wellness Visit (Medicare Advantage)  Never done    Colorectal Cancer Screen  02/24/2024    Depression Screen  02/06/2025    Breast cancer screen  02/07/2026    Cervical cancer screen  02/25/2026    Lipids  02/06/2029    Shingles vaccine  Completed    Hepatitis C screen  Completed    Hepatitis A vaccine  Aged Out    Hepatitis B vaccine  Aged Out    Hib vaccine  Aged Out    Polio vaccine  Aged Out    Meningococcal (ACWY) vaccine  Aged Out    Pneumococcal 0-64 years Vaccine  Aged Out    Diabetes screen  Discontinued    HIV screen  Discontinued        [facility]    New Patient Note/History and Physical    Date of patient's visit: 2/6/2024     Name:  Betsy Wells      Date of Birth:

## 2024-02-07 ENCOUNTER — HOSPITAL ENCOUNTER (OUTPATIENT)
Dept: WOMENS IMAGING | Age: 64
Discharge: HOME | End: 2024-02-09
Payer: MEDICARE

## 2024-02-07 DIAGNOSIS — Z12.31 ENCOUNTER FOR SCREENING MAMMOGRAM FOR BREAST CANCER: ICD-10-CM

## 2024-02-07 PROCEDURE — 77063 BREAST TOMOSYNTHESIS BI: CPT

## 2024-02-08 ENCOUNTER — TELEPHONE (OUTPATIENT)
Dept: INTERNAL MEDICINE CLINIC | Age: 64
End: 2024-02-08

## 2024-02-08 DIAGNOSIS — I10 HYPERTENSION GOAL BP (BLOOD PRESSURE) < 130/80: Primary | ICD-10-CM

## 2024-02-08 NOTE — TELEPHONE ENCOUNTER
Patient has been keeping track of her BP and it has been running high.    Yesterday morning 147/90                   Afternoon 152/90                    Evening 150/100    Today /101       She does think she needs a BP medication.    Elvis Harris

## 2024-02-09 RX ORDER — LISINOPRIL 10 MG/1
10 TABLET ORAL DAILY
Qty: 30 TABLET | Refills: 0 | Status: SHIPPED | OUTPATIENT
Start: 2024-02-09

## 2024-02-09 NOTE — TELEPHONE ENCOUNTER
Sent script for lisinopril. Please advise her to schedule f/u appt in 2-4 weeks.   Let me know if she develops dry cough.   Limit salt intake.  Bring BP log to next visit.  Orders Placed This Encounter   Medications    lisinopril (PRINIVIL;ZESTRIL) 10 MG tablet     Sig: Take 1 tablet by mouth daily     Dispense:  30 tablet     Refill:  0

## 2024-02-19 ENCOUNTER — OFFICE VISIT (OUTPATIENT)
Dept: OBGYN CLINIC | Age: 64
End: 2024-02-19
Payer: MEDICARE

## 2024-02-19 ENCOUNTER — HOSPITAL ENCOUNTER (OUTPATIENT)
Age: 64
Setting detail: SPECIMEN
Discharge: HOME | End: 2024-02-19

## 2024-02-19 VITALS
WEIGHT: 235 LBS | SYSTOLIC BLOOD PRESSURE: 126 MMHG | HEIGHT: 66 IN | DIASTOLIC BLOOD PRESSURE: 82 MMHG | BODY MASS INDEX: 37.77 KG/M2

## 2024-02-19 DIAGNOSIS — N95.0 PMB (POSTMENOPAUSAL BLEEDING): ICD-10-CM

## 2024-02-19 DIAGNOSIS — Z72.89 OTHER PROBLEMS RELATED TO LIFESTYLE: ICD-10-CM

## 2024-02-19 DIAGNOSIS — Z01.419 WELL WOMAN EXAM WITH ROUTINE GYNECOLOGICAL EXAM: Primary | ICD-10-CM

## 2024-02-19 DIAGNOSIS — Z11.51 SPECIAL SCREENING EXAMINATION FOR HUMAN PAPILLOMAVIRUS (HPV): ICD-10-CM

## 2024-02-19 LAB
BACTERIA URNS QL MICRO: NORMAL
BILIRUB UR QL STRIP: NEGATIVE
CANDIDA SPECIES: NEGATIVE
CASTS #/AREA URNS LPF: NORMAL /LPF (ref 0–8)
CLARITY UR: ABNORMAL
COLOR UR: YELLOW
EPI CELLS #/AREA URNS HPF: NORMAL /HPF (ref 0–5)
GARDNERELLA VAGINALIS: POSITIVE
GLUCOSE UR STRIP-MCNC: NEGATIVE MG/DL
HGB UR QL STRIP.AUTO: ABNORMAL
KETONES UR STRIP-MCNC: NEGATIVE MG/DL
LEUKOCYTE ESTERASE UR QL STRIP: ABNORMAL
NITRITE UR QL STRIP: NEGATIVE
PH UR STRIP: 5.5 [PH] (ref 5–8)
PROT UR STRIP-MCNC: NEGATIVE MG/DL
RBC #/AREA URNS HPF: NORMAL /HPF (ref 0–4)
SOURCE: ABNORMAL
SP GR UR STRIP: 1.02 (ref 1–1.03)
TRICHOMONAS: NEGATIVE
UROBILINOGEN UR STRIP-ACNC: NORMAL EU/DL (ref 0–1)
WBC #/AREA URNS HPF: NORMAL /HPF (ref 0–5)

## 2024-02-19 PROCEDURE — 99386 PREV VISIT NEW AGE 40-64: CPT | Performed by: NURSE PRACTITIONER

## 2024-02-19 PROCEDURE — G8484 FLU IMMUNIZE NO ADMIN: HCPCS | Performed by: NURSE PRACTITIONER

## 2024-02-19 ASSESSMENT — PATIENT HEALTH QUESTIONNAIRE - PHQ9
1. LITTLE INTEREST OR PLEASURE IN DOING THINGS: 0
SUM OF ALL RESPONSES TO PHQ9 QUESTIONS 1 & 2: 0
SUM OF ALL RESPONSES TO PHQ QUESTIONS 1-9: 0
2. FEELING DOWN, DEPRESSED OR HOPELESS: 0
SUM OF ALL RESPONSES TO PHQ QUESTIONS 1-9: 0

## 2024-02-19 NOTE — PROGRESS NOTES
Eyes: No vision changes, Headache, Dizziness or trauma in last 12 months  ENT ROS: No hearing, Tinnitis, sinus or taste problems  Hematological and Lymphatic ROS:No Lymphoma, Von Willebrand's, Hemophillia or Bleeding History  Psych ROS: No Depression, Homicidal thoughts,suicidal thoughts, or anxiety  Breast ROS: No breast abnormalities or lumps  Respiratory ROS: No SOB, Pneumoniae,Cough, or Pulmonary Embolism   Cardiovascular ROS: No Chest Pain with Exertion, Palpitations, Syncope, Edema, Arrhythmia. + hypertension  Gastrointestinal ROS: No Indigestion, Heartburn, Nausea, vomiting, Diarrhea, Constipation,or Bowel Changes; No Bloody Stools or melena  Genito-Urinary ROS: No Dysuria, Hematuria or Nocturia. No Urinary Incontinence or Vaginal Discharge. + PMB  Musculoskeletal ROS: No Arthralgia, Arthritis,Gout,Osteoporosis or Rheumatism  Neurological ROS: No CVA, Migraines, Epilepsy, Seizure Hx, or Limb Weakness  Dermatological ROS: No Rash, Itching, Hives, Mole Changes or Cancer                                                                                                                                                                                                                                  PHYSICAL Exam:     Constitutional:  Vitals:    02/19/24 0808   BP: 126/82   Site: Right Upper Arm   Position: Sitting   Cuff Size: Large Adult   Weight: 106.6 kg (235 lb)   Height: 1.676 m (5' 6\")       Chaperone for Intimate Exam  Chaperone was offered and accepted as part of the rooming process.  Chaperone: Dajuan          General Appearance:  This  is a well Developed, well Nourished, well groomed female.      Her BMI was reviewed. Nutritional decision making was discussed.    Skin:  There was a Normal Inspection of the skin without rashes or lesions.  There were no rashes.  (Papular, Maculopapular, Hives, Pustular, Macular)     There were no lesions (Ulcers, Erythema, Abn. Appearing Nevi)            Lymphatic:  No Lymph

## 2024-02-20 ENCOUNTER — HOSPITAL ENCOUNTER (OUTPATIENT)
Dept: ULTRASOUND IMAGING | Age: 64
Discharge: HOME | End: 2024-02-22
Payer: MEDICARE

## 2024-02-20 DIAGNOSIS — Z01.419 WELL WOMAN EXAM WITH ROUTINE GYNECOLOGICAL EXAM: ICD-10-CM

## 2024-02-20 DIAGNOSIS — N95.0 PMB (POSTMENOPAUSAL BLEEDING): ICD-10-CM

## 2024-02-20 LAB
C TRACH DNA SPEC QL PROBE+SIG AMP: NEGATIVE
HPV I/H RISK 4 DNA CVX QL NAA+PROBE: NOT DETECTED
HPV SAMPLE: NORMAL
HPV, INTERPRETATION: NORMAL
HPV16 DNA CVX QL NAA+PROBE: NOT DETECTED
HPV18 DNA CVX QL NAA+PROBE: NOT DETECTED
N GONORRHOEA DNA SPEC QL PROBE+SIG AMP: NEGATIVE
SPECIMEN DESCRIPTION: NORMAL
SPECIMEN DESCRIPTION: NORMAL

## 2024-02-20 PROCEDURE — 76830 TRANSVAGINAL US NON-OB: CPT

## 2024-02-20 PROCEDURE — 76857 US EXAM PELVIC LIMITED: CPT

## 2024-02-20 RX ORDER — METRONIDAZOLE 500 MG/1
500 TABLET ORAL 2 TIMES DAILY
Qty: 14 TABLET | Refills: 0 | Status: SHIPPED | OUTPATIENT
Start: 2024-02-20 | End: 2024-02-27

## 2024-02-20 NOTE — TELEPHONE ENCOUNTER
Leyla Gonsalves, APRN - CNP  P p [facility] Ob/Gyn Clinical Support Pool  Thickened endometrial lining noted.  Please let patient know and encourage follow up with physician to discuss next steps.

## 2024-02-20 NOTE — TELEPHONE ENCOUNTER
----- Message from COY Cobb - CNP sent at 2/20/2024  8:45 AM EST -----  +BV- flagyl 500mg PO BID x7 days  + hgb in urine. Seen yesterday in office with large amount of vaginal bleeding.  Hold UA for C&S results.

## 2024-02-20 NOTE — TELEPHONE ENCOUNTER
Per Leyla Gonsalves, ultrasound results along with urine and vaginal culture reviewed with patient. Patient has appointment with Dr Nieto on 2/27/24 for follow up. Script for flagyl sent to pharmacy.

## 2024-02-24 LAB — CYTOLOGY REPORT: NORMAL

## 2024-02-27 ENCOUNTER — PREP FOR PROCEDURE (OUTPATIENT)
Dept: OBGYN | Age: 64
End: 2024-02-27

## 2024-02-27 ENCOUNTER — HOSPITAL ENCOUNTER (OUTPATIENT)
Age: 64
Setting detail: SPECIMEN
Discharge: HOME | End: 2024-02-27

## 2024-02-27 ENCOUNTER — OFFICE VISIT (OUTPATIENT)
Dept: OBGYN CLINIC | Age: 64
End: 2024-02-27
Payer: MEDICARE

## 2024-02-27 VITALS
WEIGHT: 237 LBS | DIASTOLIC BLOOD PRESSURE: 70 MMHG | HEIGHT: 66 IN | BODY MASS INDEX: 38.09 KG/M2 | SYSTOLIC BLOOD PRESSURE: 136 MMHG

## 2024-02-27 DIAGNOSIS — N95.0 PMB (POSTMENOPAUSAL BLEEDING): ICD-10-CM

## 2024-02-27 DIAGNOSIS — N95.0 PMB (POSTMENOPAUSAL BLEEDING): Primary | ICD-10-CM

## 2024-02-27 DIAGNOSIS — R93.89 ENDOMETRIAL THICKENING ON ULTRASOUND: ICD-10-CM

## 2024-02-27 DIAGNOSIS — E66.09 CLASS 2 OBESITY DUE TO EXCESS CALORIES WITHOUT SERIOUS COMORBIDITY WITH BODY MASS INDEX (BMI) OF 37.0 TO 37.9 IN ADULT: ICD-10-CM

## 2024-02-27 DIAGNOSIS — Z01.818 PRE-OP TESTING: Primary | ICD-10-CM

## 2024-02-27 PROCEDURE — 99213 OFFICE O/P EST LOW 20 MIN: CPT | Performed by: OBSTETRICS & GYNECOLOGY

## 2024-02-27 PROCEDURE — 1090F PRES/ABSN URINE INCON ASSESS: CPT | Performed by: OBSTETRICS & GYNECOLOGY

## 2024-02-27 PROCEDURE — G8417 CALC BMI ABV UP PARAM F/U: HCPCS | Performed by: OBSTETRICS & GYNECOLOGY

## 2024-02-27 PROCEDURE — 1036F TOBACCO NON-USER: CPT | Performed by: OBSTETRICS & GYNECOLOGY

## 2024-02-27 PROCEDURE — G8427 DOCREV CUR MEDS BY ELIG CLIN: HCPCS | Performed by: OBSTETRICS & GYNECOLOGY

## 2024-02-27 PROCEDURE — 3017F COLORECTAL CA SCREEN DOC REV: CPT | Performed by: OBSTETRICS & GYNECOLOGY

## 2024-02-27 PROCEDURE — G8484 FLU IMMUNIZE NO ADMIN: HCPCS | Performed by: OBSTETRICS & GYNECOLOGY

## 2024-02-27 PROCEDURE — 1123F ACP DISCUSS/DSCN MKR DOCD: CPT | Performed by: OBSTETRICS & GYNECOLOGY

## 2024-02-27 PROCEDURE — G8400 PT W/DXA NO RESULTS DOC: HCPCS | Performed by: OBSTETRICS & GYNECOLOGY

## 2024-02-27 RX ORDER — SODIUM CHLORIDE, SODIUM LACTATE, POTASSIUM CHLORIDE, CALCIUM CHLORIDE 600; 310; 30; 20 MG/100ML; MG/100ML; MG/100ML; MG/100ML
INJECTION, SOLUTION INTRAVENOUS CONTINUOUS
Status: CANCELLED | OUTPATIENT
Start: 2024-02-27

## 2024-02-27 RX ORDER — SODIUM CHLORIDE 9 MG/ML
INJECTION, SOLUTION INTRAVENOUS PRN
Status: CANCELLED | OUTPATIENT
Start: 2024-02-27

## 2024-02-27 RX ORDER — SODIUM CHLORIDE 0.9 % (FLUSH) 0.9 %
5-40 SYRINGE (ML) INJECTION EVERY 12 HOURS SCHEDULED
Status: CANCELLED | OUTPATIENT
Start: 2024-02-27

## 2024-02-27 RX ORDER — SODIUM CHLORIDE 0.9 % (FLUSH) 0.9 %
5-40 SYRINGE (ML) INJECTION PRN
Status: CANCELLED | OUTPATIENT
Start: 2024-02-27

## 2024-02-28 DIAGNOSIS — N95.0 PMB (POSTMENOPAUSAL BLEEDING): ICD-10-CM

## 2024-02-28 LAB
BACTERIA URNS QL MICRO: NORMAL
BILIRUB UR QL STRIP: NEGATIVE
CASTS #/AREA URNS LPF: NORMAL /LPF (ref 0–8)
CLARITY UR: CLEAR
COLOR UR: YELLOW
EPI CELLS #/AREA URNS HPF: NORMAL /HPF (ref 0–5)
GLUCOSE UR STRIP-MCNC: NEGATIVE MG/DL
HGB UR QL STRIP.AUTO: ABNORMAL
KETONES UR STRIP-MCNC: NEGATIVE MG/DL
LEUKOCYTE ESTERASE UR QL STRIP: ABNORMAL
NITRITE UR QL STRIP: NEGATIVE
PH UR STRIP: 6.5 [PH] (ref 5–8)
PROT UR STRIP-MCNC: NEGATIVE MG/DL
RBC #/AREA URNS HPF: NORMAL /HPF (ref 0–4)
SP GR UR STRIP: 1.01 (ref 1–1.03)
UROBILINOGEN UR STRIP-ACNC: NORMAL EU/DL (ref 0–1)
WBC #/AREA URNS HPF: NORMAL /HPF (ref 0–5)

## 2024-02-28 NOTE — H&P
[facility] Obstetrics & Gynecology  [address]; [address]  [place] [address]  [phone] mn [phone] Fax        Betsy Wells  1960  Primary Care Physician: Anca Rangel APRN - CNP        HISTORY AND PHYSICAL      Hospital: [facility]      2024  MEDICAID CONSENT COMPLETED: No      HPI: Betsy Wells is a 64 y.o. female   she is being seen for the problems listed below and is planning surgical intervention. She was counseled on all conservative medical and surgical options as well as definitive procedures as well.    1. PMB (postmenopausal bleeding)    2. Class 2 obesity due to excess calories without serious comorbidity with body mass index (BMI) of 37.0 to 37.9 in adult    3. Endometrial thickening on ultrasound          Relevant Past History:   Patient Active Problem List    Diagnosis Date Noted    Class 2 obesity due to excess calories without serious comorbidity with body mass index (BMI) of 37.0 to 37.9 in adult 2021         OB History    Para Term  AB Living   2 2 2 0 0 0   SAB IAB Ectopic Molar Multiple Live Births   0 0 0 0 0 2      # Outcome Date GA Lbr Deon/2nd Weight Sex Delivery Anes PTL Lv   2 Term            1 Term                Past Medical History:   Diagnosis Date    Abnormal Pap smear of cervix     Long long ago    Hyperlipidemia        Past Surgical History:   Procedure Laterality Date    WISDOM TOOTH EXTRACTION         Social History     Socioeconomic History    Marital status:      Spouse name: Not on file    Number of children: Not on file    Years of education: Not on file    Highest education level: Not on file   Occupational History    Not on file   Tobacco Use    Smoking status: Never    Smokeless tobacco: Never   Substance and Sexual Activity    Alcohol use: No    Drug use: No    Sexual activity: Yes     Partners: Male     Comment:  35 years   Other Topics Concern    Not on file   Social History Narrative

## 2024-02-28 NOTE — PROGRESS NOTES
[facility] Obstetrics & Gynecology  [address]; [address]  [place] [address]  [phone] mn [phone] Fax        Betsy Wells  1960  Primary Care Physician: Anca Rangel APRN - CNP        HISTORY AND PHYSICAL      Hospital: [facility]      2024  MEDICAID CONSENT COMPLETED: No      HPI: Betsy Wells is a 64 y.o. female   she is being seen for the problems listed below and is planning surgical intervention. She was counseled on all conservative medical and surgical options as well as definitive procedures as well.    1. PMB (postmenopausal bleeding)    2. Class 2 obesity due to excess calories without serious comorbidity with body mass index (BMI) of 37.0 to 37.9 in adult    3. Endometrial thickening on ultrasound          Relevant Past History:   Patient Active Problem List    Diagnosis Date Noted    Class 2 obesity due to excess calories without serious comorbidity with body mass index (BMI) of 37.0 to 37.9 in adult 2021         OB History    Para Term  AB Living   2 2 2 0 0 0   SAB IAB Ectopic Molar Multiple Live Births   0 0 0 0 0 2      # Outcome Date GA Lbr Deon/2nd Weight Sex Delivery Anes PTL Lv   2 Term            1 Term                Past Medical History:   Diagnosis Date    Abnormal Pap smear of cervix     Long long ago    Hyperlipidemia        Past Surgical History:   Procedure Laterality Date    WISDOM TOOTH EXTRACTION         Social History     Socioeconomic History    Marital status:      Spouse name: Not on file    Number of children: Not on file    Years of education: Not on file    Highest education level: Not on file   Occupational History    Not on file   Tobacco Use    Smoking status: Never    Smokeless tobacco: Never   Substance and Sexual Activity    Alcohol use: No    Drug use: No    Sexual activity: Yes     Partners: Male     Comment:  35 years   Other Topics Concern    Not on file   Social History Narrative

## 2024-03-02 SDOH — HEALTH STABILITY: PHYSICAL HEALTH: ON AVERAGE, HOW MANY MINUTES DO YOU ENGAGE IN EXERCISE AT THIS LEVEL?: 40 MIN

## 2024-03-02 SDOH — HEALTH STABILITY: PHYSICAL HEALTH: ON AVERAGE, HOW MANY DAYS PER WEEK DO YOU ENGAGE IN MODERATE TO STRENUOUS EXERCISE (LIKE A BRISK WALK)?: 5 DAYS

## 2024-03-02 ASSESSMENT — LIFESTYLE VARIABLES
HOW OFTEN DO YOU HAVE A DRINK CONTAINING ALCOHOL: NEVER
HOW OFTEN DO YOU HAVE SIX OR MORE DRINKS ON ONE OCCASION: 1
HOW OFTEN DO YOU HAVE A DRINK CONTAINING ALCOHOL: 1
HOW MANY STANDARD DRINKS CONTAINING ALCOHOL DO YOU HAVE ON A TYPICAL DAY: 0
HOW MANY STANDARD DRINKS CONTAINING ALCOHOL DO YOU HAVE ON A TYPICAL DAY: PATIENT DOES NOT DRINK

## 2024-03-02 ASSESSMENT — PATIENT HEALTH QUESTIONNAIRE - PHQ9
SUM OF ALL RESPONSES TO PHQ9 QUESTIONS 1 & 2: 0
SUM OF ALL RESPONSES TO PHQ QUESTIONS 1-9: 0
2. FEELING DOWN, DEPRESSED OR HOPELESS: 0
SUM OF ALL RESPONSES TO PHQ QUESTIONS 1-9: 0
1. LITTLE INTEREST OR PLEASURE IN DOING THINGS: 0
SUM OF ALL RESPONSES TO PHQ QUESTIONS 1-9: 0
SUM OF ALL RESPONSES TO PHQ QUESTIONS 1-9: 0

## 2024-03-05 ENCOUNTER — OFFICE VISIT (OUTPATIENT)
Dept: INTERNAL MEDICINE CLINIC | Age: 64
End: 2024-03-05
Payer: MEDICARE

## 2024-03-05 VITALS
BODY MASS INDEX: 37.12 KG/M2 | WEIGHT: 231 LBS | HEART RATE: 76 BPM | HEIGHT: 66 IN | SYSTOLIC BLOOD PRESSURE: 126 MMHG | OXYGEN SATURATION: 97 % | DIASTOLIC BLOOD PRESSURE: 84 MMHG

## 2024-03-05 DIAGNOSIS — Z00.00 WELCOME TO MEDICARE PREVENTIVE VISIT: Primary | ICD-10-CM

## 2024-03-05 DIAGNOSIS — Z23 NEED FOR PNEUMOCOCCAL VACCINE: ICD-10-CM

## 2024-03-05 DIAGNOSIS — Z78.0 POST-MENOPAUSAL: ICD-10-CM

## 2024-03-05 DIAGNOSIS — E78.5 HYPERLIPIDEMIA LDL GOAL <100: ICD-10-CM

## 2024-03-05 DIAGNOSIS — I10 HYPERTENSION GOAL BP (BLOOD PRESSURE) < 130/80: ICD-10-CM

## 2024-03-05 PROCEDURE — 90677 PCV20 VACCINE IM: CPT | Performed by: NURSE PRACTITIONER

## 2024-03-05 PROCEDURE — 3079F DIAST BP 80-89 MM HG: CPT | Performed by: NURSE PRACTITIONER

## 2024-03-05 PROCEDURE — G0009 ADMIN PNEUMOCOCCAL VACCINE: HCPCS | Performed by: NURSE PRACTITIONER

## 2024-03-05 PROCEDURE — G0402 INITIAL PREVENTIVE EXAM: HCPCS | Performed by: NURSE PRACTITIONER

## 2024-03-05 PROCEDURE — 3074F SYST BP LT 130 MM HG: CPT | Performed by: NURSE PRACTITIONER

## 2024-03-05 PROCEDURE — 1123F ACP DISCUSS/DSCN MKR DOCD: CPT | Performed by: NURSE PRACTITIONER

## 2024-03-05 PROCEDURE — 3017F COLORECTAL CA SCREEN DOC REV: CPT | Performed by: NURSE PRACTITIONER

## 2024-03-05 RX ORDER — LISINOPRIL 10 MG/1
10 TABLET ORAL DAILY
Qty: 90 TABLET | Refills: 1 | Status: SHIPPED | OUTPATIENT
Start: 2024-03-05

## 2024-03-05 RX ORDER — OMEGA-3S/DHA/EPA/FISH OIL 1000-1400
CAPSULE,DELAYED RELEASE (ENTERIC COATED) ORAL
COMMUNITY

## 2024-03-05 RX ORDER — VITAMIN B COMPLEX
TABLET ORAL
COMMUNITY

## 2024-03-05 NOTE — PROGRESS NOTES
150 minutes/week                               Objective   Vitals:    03/05/24 1435   BP: 126/84   Site: Left Upper Arm   Pulse: 76   SpO2: 97%   Weight: 104.8 kg (231 lb)   Height: 1.676 m (5' 6\")      Body mass index is 37.28 kg/m².        General Appearance: alert and oriented to person, place and time, well developed and well- nourished, obese, in no acute distress  Skin: warm and dry, no rash or erythema  Head: normocephalic and atraumatic  Eyes: pupils equal, round, and reactive to light, extraocular eye movements intact, conjunctivae normal  ENT: tympanic membrane, external ear and ear canal normal bilaterally, nose without deformity, nasal mucosa and turbinates normal without polyps  Neck: supple and non-tender without mass, no thyromegaly or thyroid nodules, no cervical lymphadenopathy  Pulmonary/Chest: clear to auscultation bilaterally- no wheezes, rales or rhonchi, normal air movement, no respiratory distress  Cardiovascular: normal rate, regular rhythm, normal S1 and S2, no murmurs, rubs, clicks, or gallops, distal pulses intact, no carotid bruits  Abdomen: soft, non-tender, non-distended, normal bowel sounds, no masses or organomegaly  Extremities: no cyanosis, clubbing or edema  Musculoskeletal: normal range of motion, no joint swelling, deformity or tenderness  Neurologic: reflexes normal and symmetric, no cranial nerve deficit, gait, coordination and speech normal       No Known Allergies  Prior to Visit Medications    Medication Sig Taking? Authorizing Provider   Mag Oxide-Vit D3-Turmeric (MAGNESIUM-VITAMIN D3-TURMERIC PO) Take by mouth Yes Gianni Bedolla MD   Red Yeast Rice Extract (RED YEAST RICE PO) Take by mouth Yes Gianni Bedolla MD   Coenzyme Q10 (COQ10) 100 MG CAPS Take by mouth Yes Gianni Bedolla MD   Omega-3 Fatty Acids (FISH OIL ULTRA) 1400 MG CAPS Take by mouth Yes Gianni Bedolla MD   lisinopril (PRINIVIL;ZESTRIL) 10 MG tablet Take 1 tablet by mouth daily Yes

## 2024-03-12 ENCOUNTER — HOSPITAL ENCOUNTER (OUTPATIENT)
Dept: WOMENS IMAGING | Age: 64
Discharge: HOME | End: 2024-03-14
Payer: MEDICARE

## 2024-03-12 DIAGNOSIS — Z78.0 POST-MENOPAUSAL: ICD-10-CM

## 2024-03-12 PROCEDURE — 77080 DXA BONE DENSITY AXIAL: CPT

## 2024-03-13 ENCOUNTER — HOSPITAL ENCOUNTER (OUTPATIENT)
Dept: PREADMISSION TESTING | Age: 64
Discharge: HOME | End: 2024-03-13
Payer: MEDICARE

## 2024-03-13 ENCOUNTER — HOSPITAL ENCOUNTER (OUTPATIENT)
Dept: GENERAL RADIOLOGY | Age: 64
Discharge: HOME | End: 2024-03-15
Payer: MEDICARE

## 2024-03-13 VITALS
OXYGEN SATURATION: 98 % | RESPIRATION RATE: 16 BRPM | BODY MASS INDEX: 36.96 KG/M2 | TEMPERATURE: 97 F | HEART RATE: 75 BPM | SYSTOLIC BLOOD PRESSURE: 131 MMHG | DIASTOLIC BLOOD PRESSURE: 79 MMHG | HEIGHT: 66 IN | WEIGHT: 230 LBS

## 2024-03-13 DIAGNOSIS — N95.0 PMB (POSTMENOPAUSAL BLEEDING): ICD-10-CM

## 2024-03-13 DIAGNOSIS — R93.89 ENDOMETRIAL THICKENING ON ULTRASOUND: ICD-10-CM

## 2024-03-13 LAB
ABO + RH BLD: NORMAL
ANION GAP SERPL CALCULATED.3IONS-SCNC: 9 MMOL/L (ref 9–17)
ARM BAND NUMBER: NORMAL
BACTERIA URNS QL MICRO: ABNORMAL
BASOPHILS # BLD: 0 K/UL (ref 0–0.2)
BASOPHILS NFR BLD: 1 % (ref 0–2)
BILIRUB UR QL STRIP: NEGATIVE
BLOOD BANK SAMPLE EXPIRATION: NORMAL
BLOOD GROUP ANTIBODIES SERPL: NEGATIVE
BUN SERPL-MCNC: 19 MG/DL (ref 8–23)
CALCIUM SERPL-MCNC: 9.6 MG/DL (ref 8.6–10.4)
CASTS #/AREA URNS LPF: ABNORMAL /LPF
CHLORIDE SERPL-SCNC: 103 MMOL/L (ref 98–107)
CLARITY UR: ABNORMAL
CO2 SERPL-SCNC: 28 MMOL/L (ref 20–31)
COLOR UR: ABNORMAL
CREAT SERPL-MCNC: 0.8 MG/DL (ref 0.5–0.9)
EOSINOPHIL # BLD: 0.1 K/UL (ref 0–0.4)
EOSINOPHILS RELATIVE PERCENT: 1 % (ref 0–4)
EPI CELLS #/AREA URNS HPF: ABNORMAL /HPF
ERYTHROCYTE [DISTWIDTH] IN BLOOD BY AUTOMATED COUNT: 14.5 % (ref 11.5–14.9)
GFR SERPL CREATININE-BSD FRML MDRD: >60 ML/MIN/1.73M2
GLUCOSE SERPL-MCNC: 112 MG/DL (ref 70–99)
GLUCOSE UR STRIP-MCNC: NEGATIVE MG/DL
HCT VFR BLD AUTO: 39.5 % (ref 36–46)
HGB BLD-MCNC: 13.1 G/DL (ref 12–16)
HGB UR QL STRIP.AUTO: ABNORMAL
KETONES UR STRIP-MCNC: ABNORMAL MG/DL
LEUKOCYTE ESTERASE UR QL STRIP: ABNORMAL
LYMPHOCYTES NFR BLD: 2 K/UL (ref 1–4.8)
LYMPHOCYTES RELATIVE PERCENT: 33 % (ref 24–44)
MCH RBC QN AUTO: 28.2 PG (ref 26–34)
MCHC RBC AUTO-ENTMCNC: 33.1 G/DL (ref 31–37)
MCV RBC AUTO: 85.1 FL (ref 80–100)
MONOCYTES NFR BLD: 0.5 K/UL (ref 0.1–1.3)
MONOCYTES NFR BLD: 9 % (ref 1–7)
NEUTROPHILS NFR BLD: 56 % (ref 36–66)
NEUTS SEG NFR BLD: 3.3 K/UL (ref 1.3–9.1)
NITRITE UR QL STRIP: NEGATIVE
PH UR STRIP: 5 [PH] (ref 5–8)
PLATELET # BLD AUTO: 252 K/UL (ref 150–450)
PMV BLD AUTO: 7.7 FL (ref 6–12)
POTASSIUM SERPL-SCNC: 4.5 MMOL/L (ref 3.7–5.3)
PROT UR STRIP-MCNC: ABNORMAL MG/DL
RBC # BLD AUTO: 4.64 M/UL (ref 4–5.2)
RBC #/AREA URNS HPF: ABNORMAL /HPF
SODIUM SERPL-SCNC: 140 MMOL/L (ref 135–144)
SP GR UR STRIP: 1.02 (ref 1–1.03)
UROBILINOGEN UR STRIP-ACNC: NORMAL EU/DL (ref 0–1)
WBC #/AREA URNS HPF: ABNORMAL /HPF
WBC OTHER # BLD: 5.9 K/UL (ref 3.5–11)

## 2024-03-13 PROCEDURE — 81001 URINALYSIS AUTO W/SCOPE: CPT

## 2024-03-13 PROCEDURE — 86901 BLOOD TYPING SEROLOGIC RH(D): CPT

## 2024-03-13 PROCEDURE — 86850 RBC ANTIBODY SCREEN: CPT

## 2024-03-13 PROCEDURE — 71046 X-RAY EXAM CHEST 2 VIEWS: CPT

## 2024-03-13 PROCEDURE — 93005 ELECTROCARDIOGRAM TRACING: CPT

## 2024-03-13 PROCEDURE — 36415 COLL VENOUS BLD VENIPUNCTURE: CPT

## 2024-03-13 PROCEDURE — 80048 BASIC METABOLIC PNL TOTAL CA: CPT

## 2024-03-13 PROCEDURE — 86900 BLOOD TYPING SEROLOGIC ABO: CPT

## 2024-03-13 PROCEDURE — 85025 COMPLETE CBC W/AUTO DIFF WBC: CPT

## 2024-03-13 NOTE — DISCHARGE INSTRUCTIONS
Pre-op Instructions For Out-Patient Surgery    Medication Instructions:  Please stop herbs and any supplements now (includes vitamins and minerals).    Please contact your surgeon and prescribing physician for pre-op instructions for any blood thinners.    If you have inhalers/aerosol treatments at home, please use them the morning of your surgery and bring the inhalers with you to the hospital.    Please take the following medications the morning of your surgery with a sip of water:    none    Surgery Instructions:  After midnight before surgery:  Do not eat or drink anything, including water, mints, gum, and hard candy.  You may brush your teeth without swallowing.  No smoking, chewing tobacco, or street drugs.    Please shower or bathe before surgery.  If you were given Surgical Scrub Chlorhexidine Gluconate Liquid (CHG), please shower the night before and the morning of your surgery following the detailed instructions you received during your pre-admission visit.     Please do not wear any cologne, lotion, powder, deodorant, jewelry, piercings, perfume, makeup, nail polish, hair accessories, or hair spray on the day of surgery.  Wear loose comfortable clothing.    Leave your valuables at home but bring a payment source for any after-surgery prescriptions you plan to fill at [facility].  Bring a storage case for any glasses/contacts.    An adult who is responsible for you MUST drive you home and should be with you for the first 24 hours after surgery.     If having out-patient knee and foot surgeries, please arrange for planned crutches, walker, or wheelchair before arriving to the hospital.    The Day of Surgery:  Arrive at [facility] Surgery Entrance at the time directed by your surgeon and check in at the desk.     If you have a living will or healthcare power of , please bring a copy.    You will be taken to the pre-op holding area where you will be  prepared for surgery.  A physical assessment will be performed by a nurse practitioner or house officer.  Your IV will be started and you will meet your anesthesiologist.    When you go to surgery, your family will be directed to the surgical waiting room, where the doctor should speak with them after your surgery.    After surgery, you will be taken to the recovery area.  When you are alert and stable, you will receive instructions and be prepared for discharge.     If you use a Bi-PAP or C-PAP machine, please bring it with you and leave it in the car in case it is needed in recovery room.

## 2024-03-14 ENCOUNTER — TELEPHONE (OUTPATIENT)
Dept: OBGYN CLINIC | Age: 64
End: 2024-03-14

## 2024-03-14 LAB
EKG ATRIAL RATE: 69 BPM
EKG P AXIS: 54 DEGREES
EKG P-R INTERVAL: 152 MS
EKG Q-T INTERVAL: 424 MS
EKG QRS DURATION: 74 MS
EKG QTC CALCULATION (BAZETT): 454 MS
EKG R AXIS: 5 DEGREES
EKG T AXIS: 56 DEGREES
EKG VENTRICULAR RATE: 69 BPM

## 2024-03-14 PROCEDURE — 93010 ELECTROCARDIOGRAM REPORT: CPT | Performed by: INTERNAL MEDICINE

## 2024-03-14 NOTE — TELEPHONE ENCOUNTER
----- Message from COY Cobb - CNP sent at 3/13/2024  1:05 PM EDT -----  + hgb in urine  Keflex 500mg PO TID x 7 days

## 2024-03-14 NOTE — TELEPHONE ENCOUNTER
Patient notified of results, patient stated she is having active vaginal bleeding currently, patient is scheduled for D&C next week due to her vaginal bleeding. Patient declines Keflex at this time.

## 2024-03-15 NOTE — RESULT ENCOUNTER NOTE
Per Dr Nieto pt OK for surgery if approved by Anaesthesiologist. Pt information given to surgery scheduler Marissa for further follow up.

## 2024-03-21 ENCOUNTER — ANESTHESIA EVENT (OUTPATIENT)
Dept: OPERATING ROOM | Age: 64
End: 2024-03-21
Payer: MEDICARE

## 2024-03-21 NOTE — PRE-PROCEDURE INSTRUCTIONS
No answer, left message ?        N/A                     Unable to leave message ?N/A    When were you told to arrive at hospital ?  0745    Do you have a  ?YES    Are you on any blood thinners ? NO                    If yes when did you stop taking ?N/A    Do you have your prep Rx filled and instruction ?  N/A    Nothing to eat the day before , only clear liquids.N/A    Are you experiencing any covid symptoms ? NO    Do you have any infections or rash we should be aware of ?NO      Do you have the Hibiclens soap to use the night before and the morning of surgery ?N/A    Nothing to eat or drink after midnight, only a sip of water to take any medication instructed to take the night before.INSTRUCTED  Wear comfortable clothing, leave any valuables at home, remove any jewelry and body piercing . INSTRUCTED

## 2024-03-22 ENCOUNTER — HOSPITAL ENCOUNTER (OUTPATIENT)
Age: 64
Setting detail: OUTPATIENT SURGERY
Discharge: HOME | End: 2024-03-22
Attending: OBSTETRICS & GYNECOLOGY | Admitting: OBSTETRICS & GYNECOLOGY
Payer: MEDICARE

## 2024-03-22 ENCOUNTER — ANESTHESIA (OUTPATIENT)
Dept: OPERATING ROOM | Age: 64
End: 2024-03-22
Payer: MEDICARE

## 2024-03-22 VITALS
TEMPERATURE: 97 F | DIASTOLIC BLOOD PRESSURE: 73 MMHG | BODY MASS INDEX: 36.96 KG/M2 | HEART RATE: 66 BPM | SYSTOLIC BLOOD PRESSURE: 122 MMHG | HEIGHT: 66 IN | OXYGEN SATURATION: 96 % | RESPIRATION RATE: 18 BRPM | WEIGHT: 230 LBS

## 2024-03-22 DIAGNOSIS — R93.89 THICKENED ENDOMETRIUM: ICD-10-CM

## 2024-03-22 DIAGNOSIS — N95.0 POST-MENOPAUSAL BLEEDING: ICD-10-CM

## 2024-03-22 PROBLEM — Z98.890 STATUS POST D&C: Status: ACTIVE | Noted: 2024-03-22

## 2024-03-22 PROCEDURE — 7100000000 HC PACU RECOVERY - FIRST 15 MIN: Performed by: OBSTETRICS & GYNECOLOGY

## 2024-03-22 PROCEDURE — 3700000000 HC ANESTHESIA ATTENDED CARE: Performed by: OBSTETRICS & GYNECOLOGY

## 2024-03-22 PROCEDURE — 6360000002 HC RX W HCPCS: Performed by: NURSE ANESTHETIST, CERTIFIED REGISTERED

## 2024-03-22 PROCEDURE — 3700000001 HC ADD 15 MINUTES (ANESTHESIA): Performed by: OBSTETRICS & GYNECOLOGY

## 2024-03-22 PROCEDURE — 7100000011 HC PHASE II RECOVERY - ADDTL 15 MIN: Performed by: OBSTETRICS & GYNECOLOGY

## 2024-03-22 PROCEDURE — 88341 IMHCHEM/IMCYTCHM EA ADD ANTB: CPT

## 2024-03-22 PROCEDURE — 2580000003 HC RX 258: Performed by: OBSTETRICS & GYNECOLOGY

## 2024-03-22 PROCEDURE — 7100000001 HC PACU RECOVERY - ADDTL 15 MIN: Performed by: OBSTETRICS & GYNECOLOGY

## 2024-03-22 PROCEDURE — 3600000012 HC SURGERY LEVEL 2 ADDTL 15MIN: Performed by: OBSTETRICS & GYNECOLOGY

## 2024-03-22 PROCEDURE — 88342 IMHCHEM/IMCYTCHM 1ST ANTB: CPT

## 2024-03-22 PROCEDURE — 6370000000 HC RX 637 (ALT 250 FOR IP): Performed by: ANESTHESIOLOGY

## 2024-03-22 PROCEDURE — 2720000010 HC SURG SUPPLY STERILE: Performed by: OBSTETRICS & GYNECOLOGY

## 2024-03-22 PROCEDURE — 7100000010 HC PHASE II RECOVERY - FIRST 15 MIN: Performed by: OBSTETRICS & GYNECOLOGY

## 2024-03-22 PROCEDURE — 2500000003 HC RX 250 WO HCPCS: Performed by: ANESTHESIOLOGY

## 2024-03-22 PROCEDURE — 2500000003 HC RX 250 WO HCPCS: Performed by: NURSE ANESTHETIST, CERTIFIED REGISTERED

## 2024-03-22 PROCEDURE — 2709999900 HC NON-CHARGEABLE SUPPLY: Performed by: OBSTETRICS & GYNECOLOGY

## 2024-03-22 PROCEDURE — 88305 TISSUE EXAM BY PATHOLOGIST: CPT

## 2024-03-22 PROCEDURE — 7100000031 HC ASPR PHASE II RECOVERY - ADDTL 15 MIN: Performed by: OBSTETRICS & GYNECOLOGY

## 2024-03-22 PROCEDURE — 58558 HYSTEROSCOPY BIOPSY: CPT | Performed by: OBSTETRICS & GYNECOLOGY

## 2024-03-22 PROCEDURE — 7100000030 HC ASPR PHASE II RECOVERY - FIRST 15 MIN: Performed by: OBSTETRICS & GYNECOLOGY

## 2024-03-22 PROCEDURE — 3600000002 HC SURGERY LEVEL 2 BASE: Performed by: OBSTETRICS & GYNECOLOGY

## 2024-03-22 RX ORDER — MIDAZOLAM HYDROCHLORIDE 1 MG/ML
INJECTION INTRAMUSCULAR; INTRAVENOUS PRN
Status: DISCONTINUED | OUTPATIENT
Start: 2024-03-22 | End: 2024-03-22 | Stop reason: SDUPTHER

## 2024-03-22 RX ORDER — METOCLOPRAMIDE HYDROCHLORIDE 5 MG/ML
10 INJECTION INTRAMUSCULAR; INTRAVENOUS
Status: DISCONTINUED | OUTPATIENT
Start: 2024-03-22 | End: 2024-03-22 | Stop reason: HOSPADM

## 2024-03-22 RX ORDER — SODIUM CHLORIDE 0.9 % (FLUSH) 0.9 %
5-40 SYRINGE (ML) INJECTION EVERY 12 HOURS SCHEDULED
Status: DISCONTINUED | OUTPATIENT
Start: 2024-03-22 | End: 2024-03-22 | Stop reason: HOSPADM

## 2024-03-22 RX ORDER — ACETAMINOPHEN 325 MG/1
650 TABLET ORAL
Status: DISCONTINUED | OUTPATIENT
Start: 2024-03-22 | End: 2024-03-22 | Stop reason: HOSPADM

## 2024-03-22 RX ORDER — SODIUM CHLORIDE, SODIUM LACTATE, POTASSIUM CHLORIDE, CALCIUM CHLORIDE 600; 310; 30; 20 MG/100ML; MG/100ML; MG/100ML; MG/100ML
INJECTION, SOLUTION INTRAVENOUS CONTINUOUS
Status: DISCONTINUED | OUTPATIENT
Start: 2024-03-22 | End: 2024-03-22 | Stop reason: HOSPADM

## 2024-03-22 RX ORDER — SODIUM CHLORIDE 0.9 % (FLUSH) 0.9 %
5-40 SYRINGE (ML) INJECTION PRN
Status: DISCONTINUED | OUTPATIENT
Start: 2024-03-22 | End: 2024-03-22 | Stop reason: HOSPADM

## 2024-03-22 RX ORDER — NALOXONE HYDROCHLORIDE 0.4 MG/ML
INJECTION, SOLUTION INTRAMUSCULAR; INTRAVENOUS; SUBCUTANEOUS PRN
Status: DISCONTINUED | OUTPATIENT
Start: 2024-03-22 | End: 2024-03-22 | Stop reason: HOSPADM

## 2024-03-22 RX ORDER — LIDOCAINE HYDROCHLORIDE 10 MG/ML
1 INJECTION, SOLUTION EPIDURAL; INFILTRATION; INTRACAUDAL; PERINEURAL
Status: COMPLETED | OUTPATIENT
Start: 2024-03-22 | End: 2024-03-22

## 2024-03-22 RX ORDER — ACETAMINOPHEN 500 MG
1000 TABLET ORAL ONCE
Status: COMPLETED | OUTPATIENT
Start: 2024-03-22 | End: 2024-03-22

## 2024-03-22 RX ORDER — IBUPROFEN 600 MG/1
600 TABLET ORAL EVERY 6 HOURS PRN
Qty: 40 TABLET | Refills: 1 | Status: SHIPPED | OUTPATIENT
Start: 2024-03-22

## 2024-03-22 RX ORDER — HYDRALAZINE HYDROCHLORIDE 20 MG/ML
10 INJECTION INTRAMUSCULAR; INTRAVENOUS
Status: DISCONTINUED | OUTPATIENT
Start: 2024-03-22 | End: 2024-03-22 | Stop reason: HOSPADM

## 2024-03-22 RX ORDER — LABETALOL HYDROCHLORIDE 5 MG/ML
10 INJECTION, SOLUTION INTRAVENOUS
Status: DISCONTINUED | OUTPATIENT
Start: 2024-03-22 | End: 2024-03-22 | Stop reason: HOSPADM

## 2024-03-22 RX ORDER — SODIUM CHLORIDE 9 MG/ML
INJECTION, SOLUTION INTRAVENOUS PRN
Status: DISCONTINUED | OUTPATIENT
Start: 2024-03-22 | End: 2024-03-22 | Stop reason: HOSPADM

## 2024-03-22 RX ORDER — ONDANSETRON 2 MG/ML
INJECTION INTRAMUSCULAR; INTRAVENOUS PRN
Status: DISCONTINUED | OUTPATIENT
Start: 2024-03-22 | End: 2024-03-22 | Stop reason: SDUPTHER

## 2024-03-22 RX ORDER — DIPHENHYDRAMINE HYDROCHLORIDE 50 MG/ML
12.5 INJECTION INTRAMUSCULAR; INTRAVENOUS
Status: DISCONTINUED | OUTPATIENT
Start: 2024-03-22 | End: 2024-03-22 | Stop reason: HOSPADM

## 2024-03-22 RX ORDER — PROPOFOL 10 MG/ML
INJECTION, EMULSION INTRAVENOUS PRN
Status: DISCONTINUED | OUTPATIENT
Start: 2024-03-22 | End: 2024-03-22 | Stop reason: SDUPTHER

## 2024-03-22 RX ORDER — MEPERIDINE HYDROCHLORIDE 25 MG/ML
12.5 INJECTION INTRAMUSCULAR; INTRAVENOUS; SUBCUTANEOUS EVERY 5 MIN PRN
Status: DISCONTINUED | OUTPATIENT
Start: 2024-03-22 | End: 2024-03-22 | Stop reason: HOSPADM

## 2024-03-22 RX ORDER — FENTANYL CITRATE 0.05 MG/ML
25 INJECTION, SOLUTION INTRAMUSCULAR; INTRAVENOUS EVERY 5 MIN PRN
Status: DISCONTINUED | OUTPATIENT
Start: 2024-03-22 | End: 2024-03-22 | Stop reason: HOSPADM

## 2024-03-22 RX ORDER — ONDANSETRON 2 MG/ML
4 INJECTION INTRAMUSCULAR; INTRAVENOUS
Status: DISCONTINUED | OUTPATIENT
Start: 2024-03-22 | End: 2024-03-22 | Stop reason: HOSPADM

## 2024-03-22 RX ORDER — LIDOCAINE HYDROCHLORIDE 20 MG/ML
INJECTION, SOLUTION EPIDURAL; INFILTRATION; INTRACAUDAL; PERINEURAL PRN
Status: DISCONTINUED | OUTPATIENT
Start: 2024-03-22 | End: 2024-03-22 | Stop reason: SDUPTHER

## 2024-03-22 RX ORDER — GABAPENTIN 100 MG/1
100 CAPSULE ORAL ONCE
Status: COMPLETED | OUTPATIENT
Start: 2024-03-22 | End: 2024-03-22

## 2024-03-22 RX ORDER — ACETAMINOPHEN 500 MG
1000 TABLET ORAL EVERY 6 HOURS PRN
Qty: 30 TABLET | Refills: 1 | Status: SHIPPED | OUTPATIENT
Start: 2024-03-22

## 2024-03-22 RX ORDER — FENTANYL CITRATE 50 UG/ML
INJECTION, SOLUTION INTRAMUSCULAR; INTRAVENOUS PRN
Status: DISCONTINUED | OUTPATIENT
Start: 2024-03-22 | End: 2024-03-22 | Stop reason: SDUPTHER

## 2024-03-22 RX ORDER — LIDOCAINE HYDROCHLORIDE 20 MG/ML
INJECTION, SOLUTION EPIDURAL; INFILTRATION; INTRACAUDAL; PERINEURAL PRN
Status: DISCONTINUED | OUTPATIENT
Start: 2024-03-22 | End: 2024-03-22

## 2024-03-22 RX ORDER — DEXAMETHASONE SODIUM PHOSPHATE 4 MG/ML
INJECTION, SOLUTION INTRA-ARTICULAR; INTRALESIONAL; INTRAMUSCULAR; INTRAVENOUS; SOFT TISSUE PRN
Status: DISCONTINUED | OUTPATIENT
Start: 2024-03-22 | End: 2024-03-22 | Stop reason: SDUPTHER

## 2024-03-22 RX ORDER — ONDANSETRON 4 MG/1
4 TABLET, FILM COATED ORAL DAILY PRN
Qty: 30 TABLET | Refills: 0 | Status: SHIPPED | OUTPATIENT
Start: 2024-03-22

## 2024-03-22 RX ADMIN — ONDANSETRON 4 MG: 2 INJECTION INTRAMUSCULAR; INTRAVENOUS at 10:18

## 2024-03-22 RX ADMIN — GABAPENTIN 100 MG: 100 CAPSULE ORAL at 08:17

## 2024-03-22 RX ADMIN — MIDAZOLAM 2 MG: 1 INJECTION INTRAMUSCULAR; INTRAVENOUS at 10:02

## 2024-03-22 RX ADMIN — FENTANYL CITRATE 25 MCG: 50 INJECTION, SOLUTION INTRAMUSCULAR; INTRAVENOUS at 10:22

## 2024-03-22 RX ADMIN — SODIUM CHLORIDE, POTASSIUM CHLORIDE, SODIUM LACTATE AND CALCIUM CHLORIDE: 600; 310; 30; 20 INJECTION, SOLUTION INTRAVENOUS at 08:27

## 2024-03-22 RX ADMIN — FENTANYL CITRATE 50 MCG: 50 INJECTION, SOLUTION INTRAMUSCULAR; INTRAVENOUS at 10:08

## 2024-03-22 RX ADMIN — LIDOCAINE HYDROCHLORIDE 1 ML: 10 INJECTION, SOLUTION EPIDURAL; INFILTRATION; INTRACAUDAL; PERINEURAL at 08:27

## 2024-03-22 RX ADMIN — FENTANYL CITRATE 25 MCG: 50 INJECTION, SOLUTION INTRAMUSCULAR; INTRAVENOUS at 10:17

## 2024-03-22 RX ADMIN — ACETAMINOPHEN 1000 MG: 500 TABLET ORAL at 08:17

## 2024-03-22 RX ADMIN — LIDOCAINE HYDROCHLORIDE 60 MG: 20 INJECTION, SOLUTION EPIDURAL; INFILTRATION; INTRACAUDAL; PERINEURAL at 10:08

## 2024-03-22 RX ADMIN — PROPOFOL 200 MG: 10 INJECTION, EMULSION INTRAVENOUS at 10:08

## 2024-03-22 RX ADMIN — DEXAMETHASONE SODIUM PHOSPHATE 4 MG: 4 INJECTION, SOLUTION INTRAMUSCULAR; INTRAVENOUS at 10:15

## 2024-03-22 ASSESSMENT — PAIN - FUNCTIONAL ASSESSMENT
PAIN_FUNCTIONAL_ASSESSMENT: 0-10
PAIN_FUNCTIONAL_ASSESSMENT: NONE - DENIES PAIN
PAIN_FUNCTIONAL_ASSESSMENT: NONE - DENIES PAIN

## 2024-03-22 NOTE — PROGRESS NOTES
CLINICAL PHARMACY NOTE: MEDS TO BEDS    Total # of Prescriptions Filled: 3   The following medications were delivered to the patient:  Ondansetron 4mg  Ibuprofen 600mg  Acetaminophen 500mg     Additional Documentation: p/u at [facility] by pt herself and family 3/22/24 11:57am mick

## 2024-03-22 NOTE — H&P
Result Value Ref Range    Sodium 140 135 - 144 mmol/L    Potassium 4.5 3.7 - 5.3 mmol/L    Chloride 103 98 - 107 mmol/L    CO2 28 20 - 31 mmol/L    Anion Gap 9 9 - 17 mmol/L    Glucose 112 (H) 70 - 99 mg/dL    BUN 19 8 - 23 mg/dL    Creatinine 0.8 0.5 - 0.9 mg/dL    Est, Glom Filt Rate >60 >60 mL/min/1.73m2    Calcium 9.6 8.6 - 10.4 mg/dL   CBC auto differential   Result Value Ref Range    WBC 5.9 3.5 - 11.0 k/uL    RBC 4.64 4.0 - 5.2 m/uL    Hemoglobin 13.1 12.0 - 16.0 g/dL    Hematocrit 39.5 36 - 46 %    MCV 85.1 80 - 100 fL    MCH 28.2 26 - 34 pg    MCHC 33.1 31 - 37 g/dL    RDW 14.5 11.5 - 14.9 %    Platelets 252 150 - 450 k/uL    MPV 7.7 6.0 - 12.0 fL    Neutrophils % 56 36 - 66 %    Lymphocytes % 33 24 - 44 %    Monocytes % 9 (H) 1 - 7 %    Eosinophils % 1 0 - 4 %    Basophils % 1 0 - 2 %    Neutrophils Absolute 3.30 1.3 - 9.1 k/uL    Lymphocytes Absolute 2.00 1.0 - 4.8 k/uL    Monocytes Absolute 0.50 0.1 - 1.3 k/uL    Eosinophils Absolute 0.10 0.0 - 0.4 k/uL    Basophils Absolute 0.00 0.0 - 0.2 k/uL   Urinalysis   Result Value Ref Range    Color, UA Orange (A) Yellow    Turbidity UA Turbid (A) Clear    Glucose, Ur NEGATIVE NEGATIVE mg/dL    Bilirubin Urine NEGATIVE NEGATIVE    Ketones, Urine TRACE (A) NEGATIVE mg/dL    Specific Gravity, UA 1.023 1.000 - 1.030    Urine Hgb LARGE (A) NEGATIVE    pH, UA 5.0 5.0 - 8.0    Protein, UA 1+ (A) NEGATIVE mg/dL    Urobilinogen, Urine Normal 0.0 - 1.0 EU/dL    Nitrite, Urine NEGATIVE NEGATIVE    Leukocyte Esterase, Urine LARGE (A) NEGATIVE   Microscopic Urinalysis   Result Value Ref Range    WBC, UA TOO NUMEROUS TO COUNT (A) 0 TO 5 /HPF    RBC, UA TOO NUMEROUS TO COUNT (A) 0 TO 2 /HPF    Casts UA 3 to 5 (A) None /LPF    Epithelial Cells UA 3 to 5 /HPF    Bacteria, UA MANY (A) None   EKG 12 lead   Result Value Ref Range    Ventricular Rate 69 BPM    Atrial Rate 69 BPM    P-R Interval 152 ms    QRS Duration 74 ms    Q-T Interval 424 ms    QTc Calculation (Bazett) 454 ms     P Axis 54 degrees    R Axis 5 degrees    T Axis 56 degrees   TYPE AND SCREEN   Result Value Ref Range    Blood Bank Sample Expiration 03/25/2024,2359     Arm Band Number [id number]     ABO/Rh O POSITIVE     Antibody Screen NEGATIVE            The patient is ready for transport to the operative suite.      Electronically signed by Gabby Wolff MD on 3/22/2024 at 8:54 AM

## 2024-03-22 NOTE — ANESTHESIA PRE PROCEDURE
Vascular: negative vascular ROS.         Other Findings: L  fillings      Anesthesia Plan      general     ASA 2       Induction: intravenous.    MIPS: Postoperative opioids intended and Prophylactic antiemetics administered.  Anesthetic plan and risks discussed with patient.      Plan discussed with CRNA.                Justin Fontaine MD   3/22/2024

## 2024-03-22 NOTE — OP NOTE
Gynecologic Operative Note      NAME: Betsy Wells   MRN: [id number]  CSN: [id number]  : 1960    PROCEDURE DATE: 3/22/2024     Pre-op Diagnosis: Pre-Op Diagnosis Codes:     * Post-menopausal bleeding [N95.0]     * Thickened endometrium [R93.89]     Post-op Diagnosis: Same; Moderate amount fluffy endometrial tissue    Procedure: Procedure(s):  DILATATION AND CURETTAGE HYSTEROSCOPY W/MYOSURE POLYPECTOMY    Surgeon: Rocío Nelson DO    Assistant:   Vianey Wolff M.D. PGY2      Circulator Documentation of Staff:  Surgeon(s) and Role:     * Rocío Nelson DO - Primary    OR Staff:  Scrub Person First: Mana Holland      Anesthesia Type: General  Anesthesia Staff: Anesthesiologist: Justin Fontaine MD  CRNA: Radha Odell APRN - CRNA      Complications: None      Estimated Blood Loss: 10 ml  Total IV Fluids:  400 ml  Urine Output: 100 ml clear    Distention Media: NS (Accurate I/O at the completion of the procedure)    Specimens:   ID Type Source Tests Collected by Time Destination   A : MYOSURE ENDOMETRIAL CURRETTINGS Tissue Endometrium SURGICAL PATHOLOGY Rocío Nelson,  3/22/2024 1031    B : SHARP ENDOMETRIAL CURETTINGS Tissue Endometrium SURGICAL PATHOLOGY Rocío Nelson,  3/22/2024 1030      Implants: * No implants in log *    Drains: * No LDAs found *      Condition: Stable    Findings: Enlarged AV uterus sounded to 11 cm. Moderate amount of fluffy endometrial tissue        Description of Procedure: (Understanding of limitations from template op-reports exist)  The patient was seen in the pre-operative area. The procedure risk and complications were reviewed.  The consent , labs , and H&P were reviewed. The patient had all of her questions answered.  The patient was moved to the operative suite.  Time out was preformed. She was placed under general anesthesia by the anesthesiologist.  The patient was placed in the dorsal lithotomy position utilizing

## 2024-03-22 NOTE — ANESTHESIA POSTPROCEDURE EVALUATION
Department of Anesthesiology  Postprocedure Note    Patient: Betsy Wells  MRN: [id number]  YOB: 1960  Date of evaluation: 3/22/2024    Procedure Summary       Date: 03/22/24 Room / Location: [address]    Anesthesia Start: 1005 Anesthesia Stop: 1046    Procedure: DILATATION AND CURETTAGE HYSTEROSCOPY W/MYOSURE (Uterus) Diagnosis:       Post-menopausal bleeding      Thickened endometrium      (Post-menopausal bleeding [N95.0])      (Thickened endometrium [R93.89])    Surgeons: Rocío Nelson DO Responsible Provider: Justin Fontaine MD    Anesthesia Type: general ASA Status: 2            Anesthesia Type: No value filed.    Flaquito Phase I: Flaquito Score: 10    Flaquito Phase II: Flaquito Score: 10    Anesthesia Post Evaluation    Comments: POST- ANESTHESIA EVALUATION       Pt Name: Betsy Wells  MRN: [id number]  YOB: 1960  Date of evaluation: 3/22/2024  Time:  2:17 PM      /73   Pulse 66   Temp 97 °F (36.1 °C) (Temporal)   Resp 18   Ht 1.676 m (5' 6\")   Wt 104.3 kg (230 lb)   SpO2 96%   BMI 37.12 kg/m²      Consciousness Level  Awake  Cardiopulmonary Status  Stable  Pain Adequately Treated YES  Nausea / Vomiting  NO  Adequate Hydration  YES  Anesthesia Related Complications NONE      Electronically signed by Justin Fontaine MD on 3/22/2024 at 2:17 PM           No notable events documented.

## 2024-03-22 NOTE — DISCHARGE INSTRUCTIONS
DISCHARGE INSTRUCTIONS FOR GENERAL ANESTHESIA    In order to continue your care at home, please follow the instructions below.    Anesthesia - Do not drink any alcoholic beverages or make any legal or important decisions for 24 hours. If your surgery was on an extremity or abdomen, do not drive or operate any machinery until your surgeon approves, otherwise do not drive or operate any machinery for 24 hours or as restricted by your surgeon.     Pain Medications - Please do not drive, operate machinery, or drink alcoholic beverages while taking any prescribed pain medication.     Diet -  Start out eating lightly (broth, soup, crackers, toast, etc.) advancing as tolerated to your usual diet.  Try to avoid spicy and greasy/fatty foods for 24 hours. Drink plenty of fluids after surgery, unless you are on a fluid restriction.  Avoid milk/milk product for several hours.

## 2024-03-27 DIAGNOSIS — C54.1 ENDOMETRIAL CANCER (HCC): Primary | ICD-10-CM

## 2024-03-27 DIAGNOSIS — C55 ENDOMETRIOID ADENOCARCINOMA OF UTERUS (HCC): Primary | ICD-10-CM

## 2024-03-27 LAB — SURGICAL PATHOLOGY REPORT: NORMAL

## 2024-03-27 NOTE — RESULT ENCOUNTER NOTE
Results discussed with patient by Dr Nieto.  Per Dr Nieto a referral has been sent to Gyn/Onc.  Pt to be notified by Gyn/Onc for appointment.

## 2024-04-05 ENCOUNTER — OFFICE VISIT (OUTPATIENT)
Dept: GYNECOLOGIC ONCOLOGY | Age: 64
End: 2024-04-05

## 2024-04-05 ENCOUNTER — HOSPITAL ENCOUNTER (OUTPATIENT)
Age: 64
Setting detail: SPECIMEN
Discharge: HOME | End: 2024-04-05

## 2024-04-05 VITALS
HEART RATE: 79 BPM | SYSTOLIC BLOOD PRESSURE: 127 MMHG | OXYGEN SATURATION: 95 % | WEIGHT: 240 LBS | BODY MASS INDEX: 38.74 KG/M2 | TEMPERATURE: 97.5 F | DIASTOLIC BLOOD PRESSURE: 84 MMHG

## 2024-04-05 DIAGNOSIS — C54.1 ENDOMETRIAL CANCER (HCC): Primary | ICD-10-CM

## 2024-04-05 DIAGNOSIS — R87.616 SPECIMEN SATISFACTORY FOR EVALUATION BUT LIMITED BY ABSENCE OF ENDOCERVICAL OR TRANSFORMATION ZONE COMPONENT FROM PATIENT WITH CERVIX: ICD-10-CM

## 2024-04-05 DIAGNOSIS — Z12.4 ENCOUNTER FOR SCREENING FOR MALIGNANT NEOPLASM OF CERVIX: ICD-10-CM

## 2024-04-05 DIAGNOSIS — E66.9 OBESITY, CLASS II, BMI 35-39.9: ICD-10-CM

## 2024-04-05 ASSESSMENT — ENCOUNTER SYMPTOMS
DIARRHEA: 0
BACK PAIN: 0
SORE THROAT: 0
ABDOMINAL DISTENTION: 0
HEMOPTYSIS: 0
RECTAL PAIN: 0
EYE PROBLEMS: 0
CONSTIPATION: 0
CHEST TIGHTNESS: 0
BLOOD IN STOOL: 0
VOICE CHANGE: 0
COUGH: 0
SCLERAL ICTERUS: 0
VOMITING: 0
SHORTNESS OF BREATH: 0
NAUSEA: 0
ABDOMINAL PAIN: 0

## 2024-04-05 NOTE — PROGRESS NOTES
[facility] Gynecologic Oncology  [address] [facility] [address]  [place] [address]    GYNECOLOGIC ONCOLOGY   NEW PATIENT NOTE    PATIENT NAME:  Betsy Wells  MRN:                     [id number]  DATE:                   04/05/2024    REASON FOR VISIT:     New Patient Visit  Endometrial Cancer    HISTORY OF PRESENT ILLNESS:     Betsy Wells is a 63yo is in menopause (age: 54), who presents as a new patient referral from Dr. Rocío Sapp, regarding further evaluation and management of a newly diagnosed grade 1, Endometrioid Adenocarcinoma, based on a  Dilation & Curettage, hysteroscopy (03/22/24), under the care of Dr. Rocío Sapp. She presents, unaccompanied, to the office today     The patient reports to experience daily vaginal bleeding since July 2022. She reports to change at most, 2 pads, on her heaviest day of flow. She reports that since completing the Dilation & Curettage procedure, the bleeding symptoms are better. She reports to experience at most, symptoms of vaginal spotting, at this point.  She denies to have any symptoms of abdominal or pelvic pain. She denies taking any chronic analgesic medications.    She reports to tolerate a regular diet, with symptoms of constipation. She denies to have any symptoms of nausea, anorexia, abdominal bloating or early satiety.      She denies to have any abnormal  symptoms. She denies to have any symptoms of fatigue. She denies to have any fevers or chills.     TREATMENT SUMMARY:  THESE RECORDS HAVE BEEN REVIEWED, UNLESS OTHERWISE INDICATED     Grade 1, Endometrioid Adenocarcinoma of the UTERUS (Diagnosed 2024)  The patient reports to have experienced symptoms of post-menopausal bleeding    DIAGNOSTIC STUDIES:  IMAGING EVALUATION:  CXR (03/14/24): No acute cardiopulmonary process   Pelvic Ultrasound (02/20/24). Transabdominal and Transvaginal Views  7.6x5.5x5.6cm uterus, with a 1.6cm endometrial stripe  RIGHT ovary is unable to

## 2024-04-05 NOTE — PROGRESS NOTES
Review of Systems   Constitutional:  Negative for appetite change, chills, diaphoresis, fatigue, fever and unexpected weight change.   HENT:   Negative for hearing loss, lump/mass, mouth sores, nosebleeds, sore throat, tinnitus, trouble swallowing and voice change.    Eyes:  Negative for eye problems and icterus.   Respiratory:  Negative for chest tightness, cough, hemoptysis, shortness of breath and wheezing.    Cardiovascular:  Negative for chest pain, leg swelling and palpitations.   Gastrointestinal:  Negative for abdominal distention, abdominal pain, blood in stool, constipation, diarrhea, nausea, rectal pain and vomiting.   Endocrine: Negative for hot flashes.   Genitourinary:  Positive for vaginal bleeding (spotting since D&C). Negative for bladder incontinence, difficulty urinating, dyspareunia, dysuria, frequency, hematuria, menstrual problem, nocturia, pelvic pain and vaginal discharge.    Musculoskeletal:  Negative for arthralgias, back pain, flank pain, gait problem, myalgias, neck pain and neck stiffness.   Skin:  Negative for itching, rash and wound.   Neurological:  Negative for dizziness, extremity weakness, gait problem, headaches, light-headedness, numbness, seizures and speech difficulty.   Hematological:  Negative for adenopathy. Does not bruise/bleed easily.   Psychiatric/Behavioral:  Negative for confusion, decreased concentration, depression, sleep disturbance and suicidal ideas. The patient is nervous/anxious.

## 2024-04-05 NOTE — PATIENT INSTRUCTIONS
POST OPERATIVE INSTRUCTIONS for HYSTERECTOMY    Pain Control:  You may feel some chest, shoulder, or abdominal discomfort for a few days.  This discomfort is a result of the gas that was introduced into the abdomen during surgery.  Your body will absorb this gas within 24 to 48 hours which will relieve these symptoms.  In the meantime, it may be helpful to apply heat to your abdomen or to lie flat.  It is important to take a stool softener such as Colace while taking narcotic pain medication such as Percocet or Vicodin.  Please purchase a stool softener before your surgery.  You may take \"over the counter\" paint relievers that do not contain aspirin such as acetaminophen (Tylenol) or Ibuprofen (Motrin or Advil).  Do not exceed the daily recommended dose.    Note:  If the Pain is not relieved by pain medication, becomes worse, or you have difficulty breathing, call our office.     Incision Care:  Inspect your incision(s) daily.   A small amount of blood or clear drainage from the incisions is normal and not a cause for concern.  Bruising around your incision sites is common and not a cause for concern.  Your incisions may become itchy for a few days. This is part of the normal healing process.  As your incisions heal, they will change in color and may become numb for several weeks.  If you have small dressings or band-aids, they may be removed 24 hours.  If you have steri-strips (small adhesive strips) in place, they will peel and fall off.  If they do not fall off within 10 days, carefully peel them off.  If you have stitches, they will dissolve on their own.  If you have staples, they will be removed at the post-operative visit.  Note:  If you notice any redness, heave drainage, or bleeding from your incisions, please call our office at [phone].    Nutrition:  You may resume the diet you had prior to surgery.  Drink 6-8 glasses of water daily.    Bowel Function:  For the first several days after surgery, the

## 2024-04-09 LAB
HPV I/H RISK 4 DNA CVX QL NAA+PROBE: NOT DETECTED
HPV SAMPLE: NORMAL
HPV, INTERPRETATION: NORMAL
HPV16 DNA CVX QL NAA+PROBE: NOT DETECTED
HPV18 DNA CVX QL NAA+PROBE: NOT DETECTED
SPECIMEN DESCRIPTION: NORMAL

## 2024-04-09 RX ORDER — SODIUM CHLORIDE, SODIUM LACTATE, POTASSIUM CHLORIDE, CALCIUM CHLORIDE 600; 310; 30; 20 MG/100ML; MG/100ML; MG/100ML; MG/100ML
INJECTION, SOLUTION INTRAVENOUS CONTINUOUS
OUTPATIENT
Start: 2024-04-09

## 2024-04-09 NOTE — DISCHARGE INSTRUCTIONS
Pre-operative Instructions           NOTHING to eat or drink after midnight the night prior to surgery   (This includes gum, candy, mints, chewing tobacco, etc). Smoking cessation is always advised.     Please arrive at the surgery center (Entrance B) by 5:45-6:00 AM on 4/22/2024 (or as directed by your surgeon's office).  See Directons to Surgery Center on next page.     Please take only the following medication(s) the day of surgery with a small sip of water:     Please hold lisinopril  morning of surgery, or night prior to surgery if you take it at night.     If applicable:   -Use/bring daily inhalers with you   -Do not take diabetic medications on the day of surgery.    Please stop any blood thinning medications  AS DIRECTED BY PRESCRIBING PROVIDER! :     Failure to stop these medications as instructed (too soon or too late) may result in injury to you, or your surgery may need to be rescheduled.   Below is a list of some examples for your reference. This is not an all-inclusive list and if you have any questions/concerns, please check with your surgeon's office.     Antiplatelets : (stop blood cells (called platelets) from sticking together and forming a blood clot):   Aspirin (Bufferin, Ecotrin), Clopidogrel (Plavix), Ticagrelor (Brilinta), Prasugrel (Effient), Dipyridamole/aspirin (Aggrenox), Ticlopidine (Ticlid), Eptifibatide (Integrilin)    Anticoagulants: (slow down your body's process of making clots):   Warfarin (Coumadin), Rivaroxaban (Xarelto), Dabigatran (Pradaxa), Apixaban (Eliquis), Edoxaban (Savaysa), Heparin, Enoxaparin (Lovenox), Fondaparinux (Arixtra)    NSAIDS: Aspirin (Bufferin, Ecotrin), Ibuprofen (Motrin, Nuprin,Advil), Naproxen (Aleve),Meloxicam (Mobic), Celecoxib (Celebrex), Diclofenac (Voltaren), Etodolac (Lodine), Indomethacin, Ketorolac, Nabumetone, Oxaprozin (Daypro), Piroxicam (Feldene), Excedrin (has aspirin in it)    Herbals/supplements: Bromelain, Cinnamon, Cayenne Pepper, Dong

## 2024-04-11 ENCOUNTER — TELEPHONE (OUTPATIENT)
Dept: GYNECOLOGIC ONCOLOGY | Age: 64
End: 2024-04-11

## 2024-04-11 RX ORDER — SODIUM CHLORIDE 0.9 % (FLUSH) 0.9 %
5-40 SYRINGE (ML) INJECTION PRN
Status: CANCELLED | OUTPATIENT
Start: 2024-04-11

## 2024-04-11 RX ORDER — SODIUM CHLORIDE 9 MG/ML
INJECTION, SOLUTION INTRAVENOUS PRN
Status: CANCELLED | OUTPATIENT
Start: 2024-04-11

## 2024-04-11 RX ORDER — SODIUM CHLORIDE, SODIUM LACTATE, POTASSIUM CHLORIDE, CALCIUM CHLORIDE 600; 310; 30; 20 MG/100ML; MG/100ML; MG/100ML; MG/100ML
INJECTION, SOLUTION INTRAVENOUS CONTINUOUS
Status: CANCELLED | OUTPATIENT
Start: 2024-04-11

## 2024-04-11 RX ORDER — SODIUM CHLORIDE 0.9 % (FLUSH) 0.9 %
5-40 SYRINGE (ML) INJECTION EVERY 12 HOURS SCHEDULED
Status: CANCELLED | OUTPATIENT
Start: 2024-04-11

## 2024-04-11 NOTE — TELEPHONE ENCOUNTER
Writer YAN for patient to  return call regarding surgery/ and to call office to confirm she recieved the message      Surgery date/location    4/22/24@730 arrive at 545am  PAT  4/15/24@10am  Post Op 5/10/24@230pm with Dr Ruffin in [place].    Writer will mail out surgical pamphlet to home address.

## 2024-04-12 ENCOUNTER — HOSPITAL ENCOUNTER (OUTPATIENT)
Dept: CT IMAGING | Facility: CLINIC | Age: 64
End: 2024-04-12
Payer: MEDICARE

## 2024-04-12 DIAGNOSIS — C54.1 ENDOMETRIAL CANCER (HCC): ICD-10-CM

## 2024-04-12 PROCEDURE — 74177 CT ABD & PELVIS W/CONTRAST: CPT

## 2024-04-12 PROCEDURE — 2500000003 HC RX 250 WO HCPCS: Performed by: OBSTETRICS & GYNECOLOGY

## 2024-04-12 PROCEDURE — 2580000003 HC RX 258: Performed by: OBSTETRICS & GYNECOLOGY

## 2024-04-12 PROCEDURE — 6360000004 HC RX CONTRAST MEDICATION: Performed by: OBSTETRICS & GYNECOLOGY

## 2024-04-12 RX ORDER — SODIUM CHLORIDE 0.9 % (FLUSH) 0.9 %
10 SYRINGE (ML) INJECTION PRN
Status: DISCONTINUED | OUTPATIENT
Start: 2024-04-12 | End: 2024-04-15 | Stop reason: HOSPADM

## 2024-04-12 RX ORDER — 0.9 % SODIUM CHLORIDE 0.9 %
80 INTRAVENOUS SOLUTION INTRAVENOUS ONCE
Status: DISCONTINUED | OUTPATIENT
Start: 2024-04-12 | End: 2024-04-12

## 2024-04-12 RX ORDER — 0.9 % SODIUM CHLORIDE 0.9 %
80 INTRAVENOUS SOLUTION INTRAVENOUS ONCE
Status: COMPLETED | OUTPATIENT
Start: 2024-04-12 | End: 2024-04-12

## 2024-04-12 RX ADMIN — BARIUM SULFATE 450 ML: 20 SUSPENSION ORAL at 11:25

## 2024-04-12 RX ADMIN — SODIUM CHLORIDE, PRESERVATIVE FREE 10 ML: 5 INJECTION INTRAVENOUS at 11:30

## 2024-04-12 RX ADMIN — SODIUM CHLORIDE 80 ML: 9 INJECTION, SOLUTION INTRAVENOUS at 11:31

## 2024-04-12 RX ADMIN — IOPAMIDOL 100 ML: 755 INJECTION, SOLUTION INTRAVENOUS at 11:32

## 2024-04-12 NOTE — PROGRESS NOTES
Anesthesia Focused Assessment  Upcoming surgery:   4/22/2024 XI ROBOTIC LAPAROSCOPIC HYSTERECTOMY, BSO, ICG CERVIX DYE INJECTION, LYMPHNODE REMOVAL, ANY OTHER INDICATED PROCEDURES  *SHORT STAY* Daniel Lawler MD     History of anesthesia complications:  no  Family history of anesthesia complications:  no    METS functional capacity:   -Moderate/Excellent >4     + Covid-19 test in last 8 weeks? no    STOP-BANG Sleep Apnea Questionnaire  SNORE loudly (heard through closed doors)?   No  TIRED, fatigued, sleepy during daytime?    No  OBSERVED stopping breathing during sleep?   Yes   High blood PRESSURE or being treated?    Yes    BMI over 35?        Yes  AGE over 50?        Yes  NECK circumference over 16\"?     No  GENDER (male)?       No             Total 4  High risk 5-8  Intermediate risk 3-4  Low risk 0-2    ----------------------------------------------------------------------------------------------------------------------  CHRISTINE:                                             no  If yes, machine?:                              Type 1 DM:                                   no  T2DM:                                           no    Coronary Artery Disease:             no  Hypertension:                               yes  Defib / AICD / Pacemaker:           no  Denies cardiac history. Denies cardiac or pulmonary complaints.   EKG within 6 months on file, dated 3/13/24, NSR, resulted in epic.   Renal Failure:                               no  If yes, on dialysis?:                           Active smoker:                              no  Drinks Alcohol:                              no  Illicit drugs:                                    no    Dentition:                                      intact crowns     Past Medical History:   Diagnosis Date    Abnormal Pap smear of cervix     Long long ago    Abnormal uterine bleeding (AUB)     Endocrine cancer (HCC)     Hyperlipidemia     Hypertension     Under care of service

## 2024-04-15 ENCOUNTER — HOSPITAL ENCOUNTER (OUTPATIENT)
Dept: GENERAL RADIOLOGY | Age: 64
Discharge: HOME | End: 2024-04-17
Payer: MEDICARE

## 2024-04-15 ENCOUNTER — HOSPITAL ENCOUNTER (OUTPATIENT)
Dept: PREADMISSION TESTING | Age: 64
Discharge: HOME | End: 2024-04-19
Payer: MEDICARE

## 2024-04-15 VITALS
BODY MASS INDEX: 38.09 KG/M2 | HEART RATE: 72 BPM | HEIGHT: 66 IN | DIASTOLIC BLOOD PRESSURE: 87 MMHG | WEIGHT: 237 LBS | SYSTOLIC BLOOD PRESSURE: 145 MMHG | TEMPERATURE: 97.2 F | RESPIRATION RATE: 18 BRPM | OXYGEN SATURATION: 99 %

## 2024-04-15 DIAGNOSIS — K76.89 LIVER CYST: Primary | ICD-10-CM

## 2024-04-15 DIAGNOSIS — Z01.818 PRE-OP TESTING: ICD-10-CM

## 2024-04-15 DIAGNOSIS — K76.9 LIVER LESION: ICD-10-CM

## 2024-04-15 LAB
ABO + RH BLD: NORMAL
ALBUMIN SERPL-MCNC: 4.7 G/DL (ref 3.5–5.2)
ALBUMIN/GLOB SERPL: 2 {RATIO} (ref 1–2.5)
ALP SERPL-CCNC: 96 U/L (ref 35–104)
ALT SERPL-CCNC: 47 U/L (ref 10–35)
ANION GAP SERPL CALCULATED.3IONS-SCNC: 10 MMOL/L (ref 9–16)
ARM BAND NUMBER: NORMAL
AST SERPL-CCNC: 36 U/L (ref 10–35)
BASOPHILS # BLD: 0.03 K/UL (ref 0–0.2)
BASOPHILS NFR BLD: 0 % (ref 0–2)
BILIRUB SERPL-MCNC: 0.4 MG/DL (ref 0–1.2)
BLOOD BANK SAMPLE EXPIRATION: NORMAL
BLOOD GROUP ANTIBODIES SERPL: NEGATIVE
BUN SERPL-MCNC: 19 MG/DL (ref 8–23)
CALCIUM SERPL-MCNC: 9.6 MG/DL (ref 8.6–10.4)
CHLORIDE SERPL-SCNC: 107 MMOL/L (ref 98–107)
CO2 SERPL-SCNC: 25 MMOL/L (ref 20–31)
CREAT SERPL-MCNC: 0.8 MG/DL (ref 0.5–0.9)
EOSINOPHIL # BLD: 0.06 K/UL (ref 0–0.44)
EOSINOPHILS RELATIVE PERCENT: 1 % (ref 1–4)
ERYTHROCYTE [DISTWIDTH] IN BLOOD BY AUTOMATED COUNT: 13.6 % (ref 11.8–14.4)
GFR SERPL CREATININE-BSD FRML MDRD: 81 ML/MIN/1.73M2
GLUCOSE SERPL-MCNC: 103 MG/DL (ref 74–99)
HCT VFR BLD AUTO: 41.6 % (ref 36.3–47.1)
HGB BLD-MCNC: 13.5 G/DL (ref 11.9–15.1)
IMM GRANULOCYTES # BLD AUTO: <0.03 K/UL (ref 0–0.3)
IMM GRANULOCYTES NFR BLD: 0 %
LYMPHOCYTES NFR BLD: 2.18 K/UL (ref 1.1–3.7)
LYMPHOCYTES RELATIVE PERCENT: 31 % (ref 24–43)
MCH RBC QN AUTO: 28.1 PG (ref 25.2–33.5)
MCHC RBC AUTO-ENTMCNC: 32.5 G/DL (ref 28.4–34.8)
MCV RBC AUTO: 86.7 FL (ref 82.6–102.9)
MONOCYTES NFR BLD: 0.62 K/UL (ref 0.1–1.2)
MONOCYTES NFR BLD: 9 % (ref 3–12)
NEUTROPHILS NFR BLD: 59 % (ref 36–65)
NEUTS SEG NFR BLD: 4.22 K/UL (ref 1.5–8.1)
NRBC BLD-RTO: 0 PER 100 WBC
PLATELET # BLD AUTO: 279 K/UL (ref 138–453)
PMV BLD AUTO: 9.7 FL (ref 8.1–13.5)
POTASSIUM SERPL-SCNC: 4 MMOL/L (ref 3.7–5.3)
PROT SERPL-MCNC: 6.9 G/DL (ref 6.6–8.7)
RBC # BLD AUTO: 4.8 M/UL (ref 3.95–5.11)
SODIUM SERPL-SCNC: 142 MMOL/L (ref 136–145)
WBC OTHER # BLD: 7.1 K/UL (ref 3.5–11.3)

## 2024-04-15 PROCEDURE — 80053 COMPREHEN METABOLIC PANEL: CPT

## 2024-04-15 PROCEDURE — 86900 BLOOD TYPING SEROLOGIC ABO: CPT

## 2024-04-15 PROCEDURE — 71046 X-RAY EXAM CHEST 2 VIEWS: CPT

## 2024-04-15 PROCEDURE — 36415 COLL VENOUS BLD VENIPUNCTURE: CPT

## 2024-04-15 PROCEDURE — 85025 COMPLETE CBC W/AUTO DIFF WBC: CPT

## 2024-04-15 PROCEDURE — 86850 RBC ANTIBODY SCREEN: CPT

## 2024-04-15 PROCEDURE — 86901 BLOOD TYPING SEROLOGIC RH(D): CPT

## 2024-04-15 RX ORDER — IBUPROFEN 200 MG
1 CAPSULE ORAL DAILY
COMMUNITY

## 2024-04-15 RX ORDER — M-VIT,TX,IRON,MINS/CALC/FOLIC 27MG-0.4MG
1 TABLET ORAL DAILY
COMMUNITY

## 2024-04-15 NOTE — RESULT ENCOUNTER NOTE
Patient returned phone call and information was provided.  Patient to call central scheduling and schedule at this time.

## 2024-04-15 NOTE — RESULT ENCOUNTER NOTE
Please inform the patient regarding the CT-scan results, with \"scattered low-density liver lesions with the largest in the left lobe measuring 1.6cm. These are likely cysts, however ultrasound correlation would be useful as several of the 1cm and smaller lesions are difficult to characterize. Underlying decreased liver density is suggestive of hepatic steatosis\". Remainder of the CT-scan is normal.     She will benefit from an evaluation with a STAT MRI abdomen evaluation (Liver protocol). Please help me to get this scheduled. This needs to be done prior to the surgery date of 04/22/24 with Dr. Lawler. Once scheduled, please let me know the date.    Thank you for your assistance,  Sharlene Ruffin MD  Gynecologic Oncology

## 2024-04-16 ENCOUNTER — HOSPITAL ENCOUNTER (OUTPATIENT)
Dept: MRI IMAGING | Age: 64
Discharge: HOME | End: 2024-04-18
Payer: MEDICARE

## 2024-04-16 DIAGNOSIS — K76.9 LIVER LESION: ICD-10-CM

## 2024-04-16 PROCEDURE — 74183 MRI ABD W/O CNTR FLWD CNTR: CPT

## 2024-04-16 PROCEDURE — 2580000003 HC RX 258: Performed by: OBSTETRICS & GYNECOLOGY

## 2024-04-16 PROCEDURE — 6360000004 HC RX CONTRAST MEDICATION: Performed by: OBSTETRICS & GYNECOLOGY

## 2024-04-16 PROCEDURE — A9579 GAD-BASE MR CONTRAST NOS,1ML: HCPCS | Performed by: OBSTETRICS & GYNECOLOGY

## 2024-04-16 RX ORDER — 0.9 % SODIUM CHLORIDE 0.9 %
50 INTRAVENOUS SOLUTION INTRAVENOUS ONCE
Status: COMPLETED | OUTPATIENT
Start: 2024-04-16 | End: 2024-04-16

## 2024-04-16 RX ORDER — SODIUM CHLORIDE 0.9 % (FLUSH) 0.9 %
10 SYRINGE (ML) INJECTION ONCE
Status: COMPLETED | OUTPATIENT
Start: 2024-04-16 | End: 2024-04-16

## 2024-04-16 RX ADMIN — GADOTERIDOL 20 ML: 279.3 INJECTION, SOLUTION INTRAVENOUS at 14:47

## 2024-04-16 RX ADMIN — SODIUM CHLORIDE, PRESERVATIVE FREE 10 ML: 5 INJECTION INTRAVENOUS at 14:48

## 2024-04-16 RX ADMIN — SODIUM CHLORIDE 50 ML: 9 INJECTION, SOLUTION INTRAVENOUS at 14:48

## 2024-04-17 ENCOUNTER — TELEPHONE (OUTPATIENT)
Dept: GYNECOLOGIC ONCOLOGY | Age: 64
End: 2024-04-17

## 2024-04-17 LAB — CYTOLOGY REPORT: NORMAL

## 2024-04-17 NOTE — TELEPHONE ENCOUNTER
Per Dr. Ruffin, MRI shows Liver cysts, good for patient to have surgery with no changes to treatment plan.  Patient called and informed.  Patient voiced understanding and appreciation.    
Child

## 2024-04-21 ENCOUNTER — ANESTHESIA EVENT (OUTPATIENT)
Dept: OPERATING ROOM | Age: 64
End: 2024-04-21
Payer: MEDICARE

## 2024-04-21 NOTE — H&P
extremes of muscle mass, extra-renal   metabolism of creatine, excessive creatine ingestion, or following therapy that affects   renal tubular secretion.      Calcium 04/15/2024 9.6  8.6 - 10.4 mg/dL Final    Total Protein 04/15/2024 6.9  6.6 - 8.7 g/dL Final    Albumin 04/15/2024 4.7  3.5 - 5.2 g/dL Final    Albumin/Globulin Ratio 04/15/2024 2.0  1.0 - 2.5 Final    Total Bilirubin 04/15/2024 0.4  0.00 - 1.20 mg/dL Final    Alkaline Phosphatase 04/15/2024 96  35 - 104 U/L Final    ALT 04/15/2024 47 (H)  10 - 35 U/L Final    AST 04/15/2024 36 (H)  10 - 35 U/L Final    Blood Bank Sample Expiration 04/15/2024 04/25/2024,2359   Final    Arm Band Number 04/15/2024 BE [id number]   Final    ABO/Rh 04/15/2024 O POSITIVE   Final    Antibody Screen 04/15/2024 NEGATIVE   Final   Hospital Outpatient Visit on 04/05/2024   Component Date Value Ref Range Status    Cytology Report 04/05/2024    Final                    Value:Path Number: [id number]    DIAGNOSIS    Imaged ThinPrep Pap - Cervical (1 monolayer slide):  Specimen Adequacy:       Satisfactory for evaluation.       - Endocervical/transformation zone component present.       - Partially obscuring exudate.  Descriptive Diagnosis:       Negative for intraepithelial lesion or malignancy.          Cytotech Screener:  EN     **Electronically Signed Out**  ALDO Almonte(ASCP)  en/4/17/2024    Procedure/Addendum  HPV Procedure Report       Date Ordered:     4/8/2024     Status: Signed Out       Date Complete:     4/9/2024     By: System Interface       Date Reported:     4/9/2024              Sample:  HPV Type 16      Result:   Not Detected      Ref Range:  (Not Detected)  Sample:  HPV Type 18      Result:   Not Detected      Ref Range: (Not  Detected)  Sample:  Other High Risk HPV      Result:   Not Detected      Ref  Range: (Not Detected)  Sample:  HPV Interp      Result:         Ref Range: (Not Detected)  This test amplifies and detects DNA of 14 high-ri                          sk

## 2024-04-21 NOTE — ANESTHESIA PRE PROCEDURE
Department of Anesthesiology  Preprocedure Note       Name:  Betsy Wells   Age:  64 y.o.  :  1960                                          MRN:  [id number]         Date:  2024      Surgeon: Surgeon(s):  Daniel Lawler MD    Procedure: Procedure(s):  XI ROBOTIC LAPAROSCOPIC HYSTERECTOMY, BSO, ICG CERVIX DYE INJECTION, LYMPHNODE REMOVAL, ANY OTHER INDICATED PROCEDURES  *SHORT STAY*    Medications prior to admission:   Prior to Admission medications    Medication Sig Start Date End Date Taking? Authorizing Provider   Multiple Vitamins-Minerals (THERAPEUTIC MULTIVITAMIN-MINERALS) tablet Take 1 tablet by mouth daily    Gianni Bedolla MD   NONFORMULARY D3 157mcg-magnesium 100 mg    ProviderGianni MD   calcium citrate (CALCITRATE) 950 (200 Ca) MG tablet Take 1 tablet by mouth daily    Gianni Bedolla MD   lisinopril (PRINIVIL;ZESTRIL) 10 MG tablet Take 1 tablet by mouth daily 3/5/24   Anca Rangel, APRN - CNP       Current medications:    No current facility-administered medications for this encounter.     Current Outpatient Medications   Medication Sig Dispense Refill   • Multiple Vitamins-Minerals (THERAPEUTIC MULTIVITAMIN-MINERALS) tablet Take 1 tablet by mouth daily     • NONFORMULARY D3 157mcg-magnesium 100 mg     • calcium citrate (CALCITRATE) 950 (200 Ca) MG tablet Take 1 tablet by mouth daily     • lisinopril (PRINIVIL;ZESTRIL) 10 MG tablet Take 1 tablet by mouth daily 90 tablet 1       Allergies:  No Known Allergies    Problem List:    Patient Active Problem List   Diagnosis Code   • Class 2 obesity due to excess calories without serious comorbidity with body mass index (BMI) of 37.0 to 37.9 in adult E66.09, Z68.37   • D&C/ Hysteroscopy with myosure resection abnormal tissue 3/22/2024 Z98.890       Past Medical History:        Diagnosis Date   • Abnormal Pap smear of cervix     Long long ago   • Abnormal uterine bleeding (AUB)    • Endocrine cancer (HCC)    •

## 2024-04-22 ENCOUNTER — ANESTHESIA (OUTPATIENT)
Dept: OPERATING ROOM | Age: 64
End: 2024-04-22
Payer: MEDICARE

## 2024-04-22 ENCOUNTER — HOSPITAL ENCOUNTER (OUTPATIENT)
Age: 64
Discharge: HOME | End: 2024-04-23
Attending: OBSTETRICS & GYNECOLOGY | Admitting: OBSTETRICS & GYNECOLOGY
Payer: MEDICARE

## 2024-04-22 DIAGNOSIS — C54.1 ENDOMETRIAL CANCER (HCC): ICD-10-CM

## 2024-04-22 DIAGNOSIS — Z98.890 POSTOPERATIVE STATE: Primary | ICD-10-CM

## 2024-04-22 PROBLEM — Z90.710 H/O: HYSTERECTOMY: Status: ACTIVE | Noted: 2024-04-22

## 2024-04-22 PROCEDURE — 3600000019 HC SURGERY ROBOT ADDTL 15MIN: Performed by: OBSTETRICS & GYNECOLOGY

## 2024-04-22 PROCEDURE — 6360000002 HC RX W HCPCS: Performed by: OBSTETRICS & GYNECOLOGY

## 2024-04-22 PROCEDURE — 58571 TLH W/T/O 250 G OR LESS: CPT | Performed by: OBSTETRICS & GYNECOLOGY

## 2024-04-22 PROCEDURE — 2580000003 HC RX 258: Performed by: STUDENT IN AN ORGANIZED HEALTH CARE EDUCATION/TRAINING PROGRAM

## 2024-04-22 PROCEDURE — 3600000009 HC SURGERY ROBOT BASE: Performed by: OBSTETRICS & GYNECOLOGY

## 2024-04-22 PROCEDURE — 7100000001 HC PACU RECOVERY - ADDTL 15 MIN: Performed by: OBSTETRICS & GYNECOLOGY

## 2024-04-22 PROCEDURE — 6360000002 HC RX W HCPCS

## 2024-04-22 PROCEDURE — 38570 LAPAROSCOPY LYMPH NODE BIOP: CPT | Performed by: OBSTETRICS & GYNECOLOGY

## 2024-04-22 PROCEDURE — S2900 ROBOTIC SURGICAL SYSTEM: HCPCS | Performed by: OBSTETRICS & GYNECOLOGY

## 2024-04-22 PROCEDURE — 2709999900 HC NON-CHARGEABLE SUPPLY: Performed by: OBSTETRICS & GYNECOLOGY

## 2024-04-22 PROCEDURE — 2580000003 HC RX 258: Performed by: NURSE PRACTITIONER

## 2024-04-22 PROCEDURE — 2580000003 HC RX 258

## 2024-04-22 PROCEDURE — 3700000000 HC ANESTHESIA ATTENDED CARE: Performed by: OBSTETRICS & GYNECOLOGY

## 2024-04-22 PROCEDURE — 7100000000 HC PACU RECOVERY - FIRST 15 MIN: Performed by: OBSTETRICS & GYNECOLOGY

## 2024-04-22 PROCEDURE — 88307 TISSUE EXAM BY PATHOLOGIST: CPT

## 2024-04-22 PROCEDURE — C9290 INJ, BUPIVACAINE LIPOSOME: HCPCS | Performed by: OBSTETRICS & GYNECOLOGY

## 2024-04-22 PROCEDURE — 6370000000 HC RX 637 (ALT 250 FOR IP): Performed by: STUDENT IN AN ORGANIZED HEALTH CARE EDUCATION/TRAINING PROGRAM

## 2024-04-22 PROCEDURE — 2500000003 HC RX 250 WO HCPCS: Performed by: OBSTETRICS & GYNECOLOGY

## 2024-04-22 PROCEDURE — 6360000002 HC RX W HCPCS: Performed by: STUDENT IN AN ORGANIZED HEALTH CARE EDUCATION/TRAINING PROGRAM

## 2024-04-22 PROCEDURE — 38900 IO MAP OF SENT LYMPH NODE: CPT | Performed by: OBSTETRICS & GYNECOLOGY

## 2024-04-22 PROCEDURE — 6370000000 HC RX 637 (ALT 250 FOR IP): Performed by: OBSTETRICS & GYNECOLOGY

## 2024-04-22 PROCEDURE — 88342 IMHCHEM/IMCYTCHM 1ST ANTB: CPT

## 2024-04-22 PROCEDURE — 3700000001 HC ADD 15 MINUTES (ANESTHESIA): Performed by: OBSTETRICS & GYNECOLOGY

## 2024-04-22 PROCEDURE — 2580000003 HC RX 258: Performed by: OBSTETRICS & GYNECOLOGY

## 2024-04-22 PROCEDURE — 88309 TISSUE EXAM BY PATHOLOGIST: CPT

## 2024-04-22 PROCEDURE — 2500000003 HC RX 250 WO HCPCS

## 2024-04-22 RX ORDER — OXYCODONE HYDROCHLORIDE 5 MG/1
10 TABLET ORAL EVERY 4 HOURS PRN
Status: DISCONTINUED | OUTPATIENT
Start: 2024-04-22 | End: 2024-04-23 | Stop reason: HOSPADM

## 2024-04-22 RX ORDER — DROPERIDOL 2.5 MG/ML
0.62 INJECTION, SOLUTION INTRAMUSCULAR; INTRAVENOUS
Status: DISCONTINUED | OUTPATIENT
Start: 2024-04-22 | End: 2024-04-22 | Stop reason: HOSPADM

## 2024-04-22 RX ORDER — SODIUM CHLORIDE, SODIUM LACTATE, POTASSIUM CHLORIDE, CALCIUM CHLORIDE 600; 310; 30; 20 MG/100ML; MG/100ML; MG/100ML; MG/100ML
INJECTION, SOLUTION INTRAVENOUS CONTINUOUS PRN
Status: DISCONTINUED | OUTPATIENT
Start: 2024-04-22 | End: 2024-04-22 | Stop reason: SDUPTHER

## 2024-04-22 RX ORDER — IBUPROFEN 600 MG/1
600 TABLET ORAL EVERY 6 HOURS PRN
Qty: 40 TABLET | Refills: 1 | Status: SHIPPED | OUTPATIENT
Start: 2024-04-22

## 2024-04-22 RX ORDER — SODIUM CHLORIDE 0.9 % (FLUSH) 0.9 %
5-40 SYRINGE (ML) INJECTION PRN
Status: DISCONTINUED | OUTPATIENT
Start: 2024-04-22 | End: 2024-04-23 | Stop reason: HOSPADM

## 2024-04-22 RX ORDER — KETOROLAC TROMETHAMINE 15 MG/ML
15 INJECTION, SOLUTION INTRAMUSCULAR; INTRAVENOUS EVERY 6 HOURS
Status: COMPLETED | OUTPATIENT
Start: 2024-04-22 | End: 2024-04-22

## 2024-04-22 RX ORDER — OXYCODONE HYDROCHLORIDE 5 MG/1
5 TABLET ORAL EVERY 4 HOURS PRN
Status: DISCONTINUED | OUTPATIENT
Start: 2024-04-22 | End: 2024-04-23 | Stop reason: HOSPADM

## 2024-04-22 RX ORDER — ROCURONIUM BROMIDE 10 MG/ML
INJECTION, SOLUTION INTRAVENOUS PRN
Status: DISCONTINUED | OUTPATIENT
Start: 2024-04-22 | End: 2024-04-22 | Stop reason: SDUPTHER

## 2024-04-22 RX ORDER — KETAMINE HCL IN NACL, ISO-OSM 100MG/10ML
SYRINGE (ML) INJECTION PRN
Status: DISCONTINUED | OUTPATIENT
Start: 2024-04-22 | End: 2024-04-22 | Stop reason: SDUPTHER

## 2024-04-22 RX ORDER — ONDANSETRON 4 MG/1
4 TABLET, FILM COATED ORAL DAILY PRN
Qty: 30 TABLET | Refills: 0 | Status: SHIPPED | OUTPATIENT
Start: 2024-04-22

## 2024-04-22 RX ORDER — SENNOSIDES A AND B 8.6 MG/1
2 TABLET, FILM COATED ORAL 2 TIMES DAILY
Status: DISCONTINUED | OUTPATIENT
Start: 2024-04-22 | End: 2024-04-23 | Stop reason: HOSPADM

## 2024-04-22 RX ORDER — DEXAMETHASONE SODIUM PHOSPHATE 10 MG/ML
INJECTION INTRAMUSCULAR; INTRAVENOUS PRN
Status: DISCONTINUED | OUTPATIENT
Start: 2024-04-22 | End: 2024-04-22 | Stop reason: SDUPTHER

## 2024-04-22 RX ORDER — SODIUM CHLORIDE 0.9 % (FLUSH) 0.9 %
5-40 SYRINGE (ML) INJECTION PRN
Status: DISCONTINUED | OUTPATIENT
Start: 2024-04-22 | End: 2024-04-22 | Stop reason: HOSPADM

## 2024-04-22 RX ORDER — SODIUM CHLORIDE 9 MG/ML
INJECTION, SOLUTION INTRAVENOUS PRN
Status: DISCONTINUED | OUTPATIENT
Start: 2024-04-22 | End: 2024-04-22 | Stop reason: HOSPADM

## 2024-04-22 RX ORDER — OXYCODONE HYDROCHLORIDE 5 MG/1
5 TABLET ORAL PRN
Status: DISCONTINUED | OUTPATIENT
Start: 2024-04-22 | End: 2024-04-22 | Stop reason: HOSPADM

## 2024-04-22 RX ORDER — ENOXAPARIN SODIUM 100 MG/ML
30 INJECTION SUBCUTANEOUS 2 TIMES DAILY
Status: DISCONTINUED | OUTPATIENT
Start: 2024-04-23 | End: 2024-04-23 | Stop reason: HOSPADM

## 2024-04-22 RX ORDER — ONDANSETRON 4 MG/1
4 TABLET, ORALLY DISINTEGRATING ORAL EVERY 8 HOURS PRN
Status: DISCONTINUED | OUTPATIENT
Start: 2024-04-22 | End: 2024-04-23 | Stop reason: HOSPADM

## 2024-04-22 RX ORDER — ONDANSETRON 2 MG/ML
4 INJECTION INTRAMUSCULAR; INTRAVENOUS EVERY 6 HOURS PRN
Status: DISCONTINUED | OUTPATIENT
Start: 2024-04-22 | End: 2024-04-23 | Stop reason: HOSPADM

## 2024-04-22 RX ORDER — SODIUM CHLORIDE 9 MG/ML
INJECTION, SOLUTION INTRAVENOUS CONTINUOUS
Status: DISCONTINUED | OUTPATIENT
Start: 2024-04-22 | End: 2024-04-23

## 2024-04-22 RX ORDER — INDOCYANINE GREEN AND WATER 25 MG
KIT INJECTION PRN
Status: DISCONTINUED | OUTPATIENT
Start: 2024-04-22 | End: 2024-04-22 | Stop reason: HOSPADM

## 2024-04-22 RX ORDER — SODIUM CHLORIDE, SODIUM LACTATE, POTASSIUM CHLORIDE, CALCIUM CHLORIDE 600; 310; 30; 20 MG/100ML; MG/100ML; MG/100ML; MG/100ML
INJECTION, SOLUTION INTRAVENOUS CONTINUOUS
Status: DISCONTINUED | OUTPATIENT
Start: 2024-04-22 | End: 2024-04-22 | Stop reason: HOSPADM

## 2024-04-22 RX ORDER — SODIUM CHLORIDE 0.9 % (FLUSH) 0.9 %
5-40 SYRINGE (ML) INJECTION EVERY 12 HOURS SCHEDULED
Status: DISCONTINUED | OUTPATIENT
Start: 2024-04-22 | End: 2024-04-22 | Stop reason: HOSPADM

## 2024-04-22 RX ORDER — FENTANYL CITRATE 50 UG/ML
25 INJECTION, SOLUTION INTRAMUSCULAR; INTRAVENOUS EVERY 5 MIN PRN
Status: DISCONTINUED | OUTPATIENT
Start: 2024-04-22 | End: 2024-04-22 | Stop reason: HOSPADM

## 2024-04-22 RX ORDER — LABETALOL HYDROCHLORIDE 5 MG/ML
10 INJECTION, SOLUTION INTRAVENOUS
Status: DISCONTINUED | OUTPATIENT
Start: 2024-04-22 | End: 2024-04-22 | Stop reason: HOSPADM

## 2024-04-22 RX ORDER — SODIUM CHLORIDE 0.9 % (FLUSH) 0.9 %
5-40 SYRINGE (ML) INJECTION EVERY 12 HOURS SCHEDULED
Status: DISCONTINUED | OUTPATIENT
Start: 2024-04-22 | End: 2024-04-23 | Stop reason: HOSPADM

## 2024-04-22 RX ORDER — METRONIDAZOLE 500 MG/100ML
INJECTION, SOLUTION INTRAVENOUS PRN
Status: DISCONTINUED | OUTPATIENT
Start: 2024-04-22 | End: 2024-04-22 | Stop reason: SDUPTHER

## 2024-04-22 RX ORDER — LISINOPRIL 10 MG/1
10 TABLET ORAL DAILY
Status: DISCONTINUED | OUTPATIENT
Start: 2024-04-23 | End: 2024-04-23 | Stop reason: HOSPADM

## 2024-04-22 RX ORDER — SODIUM CHLORIDE 9 MG/ML
INJECTION, SOLUTION INTRAVENOUS PRN
Status: DISCONTINUED | OUTPATIENT
Start: 2024-04-22 | End: 2024-04-23 | Stop reason: HOSPADM

## 2024-04-22 RX ORDER — SIMETHICONE 80 MG
80 TABLET,CHEWABLE ORAL 4 TIMES DAILY PRN
Qty: 180 TABLET | Refills: 3 | Status: SHIPPED | OUTPATIENT
Start: 2024-04-22

## 2024-04-22 RX ORDER — CEFAZOLIN SODIUM 1 G/3ML
INJECTION, POWDER, FOR SOLUTION INTRAMUSCULAR; INTRAVENOUS PRN
Status: DISCONTINUED | OUTPATIENT
Start: 2024-04-22 | End: 2024-04-22 | Stop reason: SDUPTHER

## 2024-04-22 RX ORDER — DIPHENHYDRAMINE HYDROCHLORIDE 50 MG/ML
12.5 INJECTION INTRAMUSCULAR; INTRAVENOUS
Status: DISCONTINUED | OUTPATIENT
Start: 2024-04-22 | End: 2024-04-22 | Stop reason: HOSPADM

## 2024-04-22 RX ORDER — FENTANYL CITRATE 50 UG/ML
INJECTION, SOLUTION INTRAMUSCULAR; INTRAVENOUS PRN
Status: DISCONTINUED | OUTPATIENT
Start: 2024-04-22 | End: 2024-04-22 | Stop reason: SDUPTHER

## 2024-04-22 RX ORDER — ACETAMINOPHEN 500 MG
1000 TABLET ORAL EVERY 8 HOURS SCHEDULED
Status: DISCONTINUED | OUTPATIENT
Start: 2024-04-22 | End: 2024-04-23 | Stop reason: HOSPADM

## 2024-04-22 RX ORDER — ENOXAPARIN SODIUM 100 MG/ML
40 INJECTION SUBCUTANEOUS DAILY
Qty: 11.2 ML | Refills: 0 | Status: SHIPPED | OUTPATIENT
Start: 2024-04-22 | End: 2024-05-20

## 2024-04-22 RX ORDER — NALOXONE HYDROCHLORIDE 0.4 MG/ML
INJECTION, SOLUTION INTRAMUSCULAR; INTRAVENOUS; SUBCUTANEOUS PRN
Status: DISCONTINUED | OUTPATIENT
Start: 2024-04-22 | End: 2024-04-22 | Stop reason: HOSPADM

## 2024-04-22 RX ORDER — SENNA AND DOCUSATE SODIUM 50; 8.6 MG/1; MG/1
1 TABLET, FILM COATED ORAL 2 TIMES DAILY
Qty: 30 TABLET | Refills: 1 | Status: SHIPPED | OUTPATIENT
Start: 2024-04-22

## 2024-04-22 RX ORDER — MIDAZOLAM HYDROCHLORIDE 1 MG/ML
INJECTION INTRAMUSCULAR; INTRAVENOUS PRN
Status: DISCONTINUED | OUTPATIENT
Start: 2024-04-22 | End: 2024-04-22 | Stop reason: SDUPTHER

## 2024-04-22 RX ORDER — ONDANSETRON 2 MG/ML
INJECTION INTRAMUSCULAR; INTRAVENOUS PRN
Status: DISCONTINUED | OUTPATIENT
Start: 2024-04-22 | End: 2024-04-22 | Stop reason: SDUPTHER

## 2024-04-22 RX ORDER — IBUPROFEN 800 MG/1
800 TABLET ORAL EVERY 8 HOURS
Status: DISCONTINUED | OUTPATIENT
Start: 2024-04-22 | End: 2024-04-23 | Stop reason: HOSPADM

## 2024-04-22 RX ORDER — HYDRALAZINE HYDROCHLORIDE 20 MG/ML
10 INJECTION INTRAMUSCULAR; INTRAVENOUS
Status: DISCONTINUED | OUTPATIENT
Start: 2024-04-22 | End: 2024-04-22 | Stop reason: HOSPADM

## 2024-04-22 RX ORDER — MAGNESIUM HYDROXIDE 1200 MG/15ML
LIQUID ORAL CONTINUOUS PRN
Status: DISCONTINUED | OUTPATIENT
Start: 2024-04-22 | End: 2024-04-22 | Stop reason: HOSPADM

## 2024-04-22 RX ORDER — ACETAMINOPHEN 500 MG
1000 TABLET ORAL EVERY 6 HOURS PRN
Qty: 30 TABLET | Refills: 1 | Status: SHIPPED | OUTPATIENT
Start: 2024-04-22

## 2024-04-22 RX ORDER — OXYCODONE HYDROCHLORIDE 5 MG/1
10 TABLET ORAL PRN
Status: DISCONTINUED | OUTPATIENT
Start: 2024-04-22 | End: 2024-04-22 | Stop reason: HOSPADM

## 2024-04-22 RX ORDER — OXYCODONE HYDROCHLORIDE 5 MG/1
5 TABLET ORAL EVERY 6 HOURS PRN
Qty: 20 TABLET | Refills: 0 | Status: SHIPPED | OUTPATIENT
Start: 2024-04-22 | End: 2024-04-27

## 2024-04-22 RX ORDER — ACETAMINOPHEN 325 MG/1
650 TABLET ORAL EVERY 6 HOURS PRN
COMMUNITY

## 2024-04-22 RX ORDER — ULTRASOUND COUPLING MEDIUM
GEL (GRAM) TOPICAL PRN
Status: DISCONTINUED | OUTPATIENT
Start: 2024-04-22 | End: 2024-04-22 | Stop reason: HOSPADM

## 2024-04-22 RX ORDER — PHENYLEPHRINE HCL IN 0.9% NACL 1 MG/10 ML
SYRINGE (ML) INTRAVENOUS PRN
Status: DISCONTINUED | OUTPATIENT
Start: 2024-04-22 | End: 2024-04-22 | Stop reason: SDUPTHER

## 2024-04-22 RX ORDER — PROCHLORPERAZINE EDISYLATE 5 MG/ML
5 INJECTION INTRAMUSCULAR; INTRAVENOUS
Status: DISCONTINUED | OUTPATIENT
Start: 2024-04-22 | End: 2024-04-22 | Stop reason: HOSPADM

## 2024-04-22 RX ORDER — MIDAZOLAM HYDROCHLORIDE 2 MG/2ML
2 INJECTION, SOLUTION INTRAMUSCULAR; INTRAVENOUS
Status: DISCONTINUED | OUTPATIENT
Start: 2024-04-22 | End: 2024-04-22 | Stop reason: HOSPADM

## 2024-04-22 RX ORDER — LIDOCAINE HYDROCHLORIDE 10 MG/ML
INJECTION, SOLUTION INFILTRATION; PERINEURAL PRN
Status: DISCONTINUED | OUTPATIENT
Start: 2024-04-22 | End: 2024-04-22 | Stop reason: SDUPTHER

## 2024-04-22 RX ORDER — PROPOFOL 10 MG/ML
INJECTION, EMULSION INTRAVENOUS PRN
Status: DISCONTINUED | OUTPATIENT
Start: 2024-04-22 | End: 2024-04-22 | Stop reason: SDUPTHER

## 2024-04-22 RX ADMIN — KETOROLAC TROMETHAMINE 15 MG: 15 INJECTION, SOLUTION INTRAMUSCULAR; INTRAVENOUS at 13:46

## 2024-04-22 RX ADMIN — SENNOSIDES 17.2 MG: 8.6 TABLET, FILM COATED ORAL at 13:46

## 2024-04-22 RX ADMIN — MIDAZOLAM 2 MG: 1 INJECTION INTRAMUSCULAR; INTRAVENOUS at 07:36

## 2024-04-22 RX ADMIN — DEXAMETHASONE SODIUM PHOSPHATE 10 MG: 10 INJECTION INTRAMUSCULAR; INTRAVENOUS at 07:50

## 2024-04-22 RX ADMIN — ACETAMINOPHEN 1000 MG: 500 TABLET ORAL at 23:15

## 2024-04-22 RX ADMIN — Medication 10 MG: at 09:55

## 2024-04-22 RX ADMIN — SODIUM CHLORIDE, POTASSIUM CHLORIDE, SODIUM LACTATE AND CALCIUM CHLORIDE: 600; 310; 30; 20 INJECTION, SOLUTION INTRAVENOUS at 07:45

## 2024-04-22 RX ADMIN — Medication 20 MG: at 08:58

## 2024-04-22 RX ADMIN — LIDOCAINE HYDROCHLORIDE 50 MG: 10 INJECTION, SOLUTION INFILTRATION; PERINEURAL at 07:42

## 2024-04-22 RX ADMIN — PROPOFOL 150 MG: 10 INJECTION, EMULSION INTRAVENOUS at 07:42

## 2024-04-22 RX ADMIN — IBUPROFEN 800 MG: 800 TABLET, FILM COATED ORAL at 20:25

## 2024-04-22 RX ADMIN — SODIUM CHLORIDE: 9 INJECTION, SOLUTION INTRAVENOUS at 13:40

## 2024-04-22 RX ADMIN — ROCURONIUM BROMIDE 50 MG: 10 INJECTION INTRAVENOUS at 07:43

## 2024-04-22 RX ADMIN — Medication 20 MG: at 08:06

## 2024-04-22 RX ADMIN — HYDROMORPHONE HYDROCHLORIDE 0.5 MG: 1 INJECTION, SOLUTION INTRAMUSCULAR; INTRAVENOUS; SUBCUTANEOUS at 10:32

## 2024-04-22 RX ADMIN — FENTANYL CITRATE 100 MCG: 0.05 INJECTION, SOLUTION INTRAMUSCULAR; INTRAVENOUS at 07:42

## 2024-04-22 RX ADMIN — FENTANYL CITRATE 50 MCG: 0.05 INJECTION, SOLUTION INTRAMUSCULAR; INTRAVENOUS at 09:55

## 2024-04-22 RX ADMIN — SODIUM CHLORIDE, POTASSIUM CHLORIDE, SODIUM LACTATE AND CALCIUM CHLORIDE: 600; 310; 30; 20 INJECTION, SOLUTION INTRAVENOUS at 10:25

## 2024-04-22 RX ADMIN — FENTANYL CITRATE 50 MCG: 0.05 INJECTION, SOLUTION INTRAMUSCULAR; INTRAVENOUS at 08:31

## 2024-04-22 RX ADMIN — Medication 100 MCG: at 08:17

## 2024-04-22 RX ADMIN — SODIUM CHLORIDE: 9 INJECTION, SOLUTION INTRAVENOUS at 20:40

## 2024-04-22 RX ADMIN — ROCURONIUM BROMIDE 10 MG: 10 INJECTION INTRAVENOUS at 08:58

## 2024-04-22 RX ADMIN — METRONIDAZOLE 500 MG: 500 INJECTION, SOLUTION INTRAVENOUS at 08:35

## 2024-04-22 RX ADMIN — SUGAMMADEX 300 MG: 100 INJECTION, SOLUTION INTRAVENOUS at 10:25

## 2024-04-22 RX ADMIN — SODIUM CHLORIDE, PRESERVATIVE FREE 10 ML: 5 INJECTION INTRAVENOUS at 20:25

## 2024-04-22 RX ADMIN — SODIUM CHLORIDE, POTASSIUM CHLORIDE, SODIUM LACTATE AND CALCIUM CHLORIDE: 600; 310; 30; 20 INJECTION, SOLUTION INTRAVENOUS at 06:45

## 2024-04-22 RX ADMIN — CEFAZOLIN 2 G: 1 INJECTION, POWDER, FOR SOLUTION INTRAMUSCULAR; INTRAVENOUS at 07:50

## 2024-04-22 RX ADMIN — ONDANSETRON 4 MG: 2 INJECTION INTRAMUSCULAR; INTRAVENOUS at 10:08

## 2024-04-22 RX ADMIN — ROCURONIUM BROMIDE 20 MG: 10 INJECTION INTRAVENOUS at 07:47

## 2024-04-22 RX ADMIN — SENNOSIDES 17.2 MG: 8.6 TABLET, FILM COATED ORAL at 20:25

## 2024-04-22 RX ADMIN — SODIUM CHLORIDE, POTASSIUM CHLORIDE, SODIUM LACTATE AND CALCIUM CHLORIDE: 600; 310; 30; 20 INJECTION, SOLUTION INTRAVENOUS at 07:37

## 2024-04-22 RX ADMIN — ACETAMINOPHEN 1000 MG: 500 TABLET ORAL at 13:46

## 2024-04-22 RX ADMIN — Medication 100 MCG: at 08:11

## 2024-04-22 RX ADMIN — ROCURONIUM BROMIDE 10 MG: 10 INJECTION INTRAVENOUS at 09:42

## 2024-04-22 RX ADMIN — ROCURONIUM BROMIDE 20 MG: 10 INJECTION INTRAVENOUS at 08:15

## 2024-04-22 ASSESSMENT — PAIN DESCRIPTION - DESCRIPTORS
DESCRIPTORS: CRAMPING
DESCRIPTORS: ACHING;DISCOMFORT
DESCRIPTORS: DISCOMFORT
DESCRIPTORS: CRAMPING
DESCRIPTORS: CRAMPING

## 2024-04-22 ASSESSMENT — PAIN DESCRIPTION - ONSET: ONSET: ON-GOING

## 2024-04-22 ASSESSMENT — PAIN DESCRIPTION - LOCATION
LOCATION: ABDOMEN

## 2024-04-22 ASSESSMENT — PAIN - FUNCTIONAL ASSESSMENT
PAIN_FUNCTIONAL_ASSESSMENT: ACTIVITIES ARE NOT PREVENTED
PAIN_FUNCTIONAL_ASSESSMENT: 0-10

## 2024-04-22 ASSESSMENT — PAIN SCALES - GENERAL
PAINLEVEL_OUTOF10: 3
PAINLEVEL_OUTOF10: 2
PAINLEVEL_OUTOF10: 1
PAINLEVEL_OUTOF10: 5
PAINLEVEL_OUTOF10: 2

## 2024-04-22 ASSESSMENT — PAIN DESCRIPTION - FREQUENCY: FREQUENCY: CONTINUOUS

## 2024-04-22 ASSESSMENT — PAIN DESCRIPTION - PAIN TYPE: TYPE: SURGICAL PAIN

## 2024-04-22 NOTE — PROGRESS NOTES
GYN RESIDENT INTERVAL NOTE      Reviewed patient's post-op course, good UOP thus far. Per RN patient still in significant pain. Will re-evaluate for potential discharge tonight or tomorrow AM      Vitals:    04/22/24 1145 04/22/24 1200 04/22/24 1259 04/22/24 1335   BP: 135/84 134/76 132/86 (!) 140/86   Pulse: 74 74 71 78   Resp: 11 12 14 16   Temp:    97.4 °F (36.3 °C)   TempSrc:    Oral   SpO2: 94% 94%     Weight:       Height:             No results found for this or any previous visit (from the past 12 hour(s)).            Tammi Green DO  OB/GYN Resident  [facility]  4/22/2024 2:32 PM

## 2024-04-22 NOTE — OP NOTE
incision was closed with 2-0 Vicryl sutures with an excellent tight fascial closure achieved here.  The abdomen was desufflated.  The remaining trocars were removed.  The subcutaneous tissues and skin of the 4 incisions were closed with Monocryl suture and skin glue.  The drain on the right lower quadrant was secured to the skin with 2 Prolene sutures.  The cystoscopy was performed at the end of the operation, which confirmed that the bladder was normal with urine briskly spilling from both ureteral orifices.  The bladder was drained.  The catheter was left out.  The patient was extubated, awakened, moved to the recovery room in stable condition.  Sponge, lap, needle, and instrument counts were correct x2 at the end of the operation.  Dr. Lawler scrubbed and present for all key portions of the operation.          ILDEFONSO LAWLER MD      D:  04/22/2024 10:44:08     T:  04/22/2024 11:09:56     CVL/ROSELIA  Job #:  [id number]     Doc#:  [id number]

## 2024-04-22 NOTE — BRIEF OP NOTE
Brief Operative Note  Department of Obstetrics and Gynecology  [facility]     Patient: Betsy Wells   : 1960  MRN: [id number]       Acct: [id number]   Date of Procedure: 24     Pre-operative Diagnosis: 64 y.o. female   Endometrial adenocarcinoma   Essential Hypertension   Hyperlipidemia   Post-menopausal bleeding   History of hysteroscopy, dilation and curettage   BMI 38    Post-operative Diagnosis:   Endometrial adenocarcinoma   Essential Hypertension   Hyperlipidemia   Post-menopausal bleeding   History of hysteroscopy, dilation and curettage   BMI 38    Procedure: Robotic assisted laparoscopic hysterectomy, bilateral salpingo-oophorectomy, ICG cervical dye injection, sentinel lymph node biopsies, cystoscopy     Surgeon: Dr. Lawler     Assistant(s): Tammi Green DO, PGY3; Gabby Pemberton, PGY2    Anesthesia: general via ET tube    Findings: normal appearing external genitalia, vagina and cervix. Normal appearing uterus, bilateral fallopian tubes and ovaries. Bilateral ureteral jets visualized, normal appearing bladder mucosa  Total IV fluids/Blood products:  1500 ml crystalloid  Urine Output:  350 ml    Estimated blood loss:  100ml  Drains:  R side SILVIA drain to pelvis   Specimens:  bilateral pelvic sentinel lymph nodes, cervix, uterus, bilateral fallopian tubes and bilateral ovaries   Instrument and Sponge Count: Correct  Complications:  none  Condition:  stable, transferred to post anesthesia recovery    See full operative report for further details.    Tammi Green DO  Ob/Gyn Resident  2024, 10:26 AM

## 2024-04-22 NOTE — PROGRESS NOTES
Gyn Oncology Note    Patient seen and evaluated by me today  I counseled her extensively today about the operation and the serious risks, downstream consequences of the operation  She tells me that I have answered her questions to her satisfaction  She tells me that she understands and accepts all that I explained to her today  She tells me that she is ready to proceed to OR today    I have revd her history, notes, records, path and imaging today in detail      LJ Lawler MD

## 2024-04-22 NOTE — PROGRESS NOTES
Pharmacist Review and Automatic Dose Adjustment of Prophylactic Enoxaparin    Reviewed reason(s) for admission/hospital problem list    The reviewing pharmacist has made an adjustment to the ordered enoxaparin dose or converted to UFH per the approved [facility] protocol and table as identified below.        Betsy Wells is a 64 y.o. female.     No results for input(s): \"CREATININE\" in the last 72 hours.    Estimated Creatinine Clearance: 87 mL/min (based on SCr of 0.8 mg/dL).    No results for input(s): \"HGB\", \"HCT\", \"PLT\" in the last 72 hours.  No results for input(s): \"INR\" in the last 72 hours.    Height:   Ht Readings from Last 1 Encounters:   04/22/24 1.676 m (5' 6\")     Weight:  Wt Readings from Last 1 Encounters:   04/22/24 107.4 kg (236 lb 12.4 oz)               Plan: Based upon the patient's weight and renal function    Ordered: Enoxaparin 40mg SUBQ Daily    Changed/converted to    New Order: Enoxaparin 30mg SUBQ BID      Thank you,  Roshni Tovar, [facility]  4/22/2024, 1:40 PM

## 2024-04-23 VITALS
WEIGHT: 236.77 LBS | RESPIRATION RATE: 17 BRPM | TEMPERATURE: 97.5 F | BODY MASS INDEX: 38.05 KG/M2 | SYSTOLIC BLOOD PRESSURE: 140 MMHG | HEART RATE: 85 BPM | HEIGHT: 66 IN | OXYGEN SATURATION: 96 % | DIASTOLIC BLOOD PRESSURE: 85 MMHG

## 2024-04-23 PROCEDURE — 2580000003 HC RX 258: Performed by: STUDENT IN AN ORGANIZED HEALTH CARE EDUCATION/TRAINING PROGRAM

## 2024-04-23 PROCEDURE — 6370000000 HC RX 637 (ALT 250 FOR IP): Performed by: STUDENT IN AN ORGANIZED HEALTH CARE EDUCATION/TRAINING PROGRAM

## 2024-04-23 RX ADMIN — SODIUM CHLORIDE, PRESERVATIVE FREE 10 ML: 5 INJECTION INTRAVENOUS at 08:36

## 2024-04-23 RX ADMIN — IBUPROFEN 800 MG: 800 TABLET, FILM COATED ORAL at 03:36

## 2024-04-23 RX ADMIN — SODIUM CHLORIDE: 9 INJECTION, SOLUTION INTRAVENOUS at 03:39

## 2024-04-23 RX ADMIN — LISINOPRIL 10 MG: 10 TABLET ORAL at 08:36

## 2024-04-23 RX ADMIN — SENNOSIDES 17.2 MG: 8.6 TABLET, FILM COATED ORAL at 08:36

## 2024-04-23 RX ADMIN — ACETAMINOPHEN 1000 MG: 500 TABLET ORAL at 06:25

## 2024-04-23 RX ADMIN — OXYCODONE 10 MG: 5 TABLET ORAL at 10:00

## 2024-04-23 ASSESSMENT — PAIN SCALES - GENERAL
PAINLEVEL_OUTOF10: 4
PAINLEVEL_OUTOF10: 8
PAINLEVEL_OUTOF10: 2
PAINLEVEL_OUTOF10: 4

## 2024-04-23 ASSESSMENT — PAIN DESCRIPTION - LOCATION
LOCATION: ABDOMEN

## 2024-04-23 ASSESSMENT — PAIN DESCRIPTION - DESCRIPTORS
DESCRIPTORS: DISCOMFORT
DESCRIPTORS: DISCOMFORT

## 2024-04-23 NOTE — PROGRESS NOTES
Gynecology Oncology Progress Note      Betsy Wells is a 64 y.o. female , POD # 1 s/p RALH, BSO, nodes cysto 24     Patient seen and examined. Pain is well controlled. Patient did not require elvi overnight. Patient is  tolerating oral intake. She is urinating. She denies any vaginal bleeding. She is  ambulating without assistance.  She is not passing flatus however bowel sounds are present. She denies Fever/Chills, Chest Pain, SOB, N/V, Calf Pain.    Vitals:  Vitals:    24 1335 24 2023 24 2314 24 0335   BP: (!) 140/86 128/74 127/73 (!) 141/74   Pulse: 78 94 93 94   Resp: 16 16 16 17   Temp: 97.4 °F (36.3 °C) 98.6 °F (37 °C) 98.8 °F (37.1 °C) 98 °F (36.7 °C)   TempSrc: Oral Oral Oral Oral   SpO2:  93% 93% 94%   Weight:       Height:             Intake/Output:   Last Shift: I/O last 3 completed shifts:  In: 1500 [I.V.:1500]  Out: 810 [Urine:700; Drains:10; Blood:100]  Current Shift: I/O this shift:  In: . [I.V.:]  Out: 2215 [Urine:2100; Drains:115]    2800 mL in the last 12 hrs of clear UOP ~233mL/hr    Physical Exam:  General: Alert and oriented, no acute distress  HEENT: normocephalic, atraumatic, supple, symmetrical, trachea midline, Pupils equal and reactive to light, Extraocular muscles intact, sclera non icteric  Respiratory: Good air movement bilaterally, unlabored respirations, no conversational dyspnea  Cardiovascular: Regular rate and rhythm  Abdomen:  soft, non tender, non distended, no rebound, guarding, or rigidity, bowel sounds present, drain present on RLQ  Incisions: 4 sites, clean/dry/intact, affixed with dermabond.  Extremities: No LE edema, no calf tenderness or swelling, EPC on  Psych: affect appropriate    Lab:  No results found for this or any previous visit (from the past 24 hour(s)).      Assessment/Plan:  Betsy Russell 64 y.o. female , POD # 1 s/p RALH, BSO, nodes cysto 24    - Doing well, vitals overall stable   - S/p yamila

## 2024-04-23 NOTE — PROGRESS NOTES
Discharge instructions presented to patient with verbal understanding and teachback demonstration of SILVIA emptying. All belongings went with patient and  came to .

## 2024-04-23 NOTE — DISCHARGE SUMMARY
Gyn/Onc Discharge Summary  [facility]      Patient Name: Betsy Wells  Patient : 1960  Primary Care Physician: Anca Rangel APRN - CNP  Admit Date: 2024    Principal Diagnosis: post-operative state    Other Diagnosis:   Endometrial cancer (HCC) [C54.1]  Post-operative state [Z98.890]  Patient Active Problem List   Diagnosis    Class 2 obesity due to excess calories without serious comorbidity with body mass index (BMI) of 37.0 to 37.9 in adult    D&C/ Hysteroscopy with myosure resection abnormal tissue 3/22/2024    Postoperative state    Endometrial cancer (HCC)    Post-operative state    s/p RALH, BSO, Cysto, lymph nodes 24       Infection: No  Hospital Acquired: n.a    Surgical Operations & Procedures: Robotic assisted laparoscopic hysterectomy, bilateral salpingo-oophorectomy, sentinel lymph node biopsies, cystoscopy    Consultations: Anesthesia    Pertinent Findings & Procedures:   Betsy Wells is a 64 y.o. female , admitted for above procedure; received ancef and flagyl pre-operatively.  She underwent Robotic assisted laparoscopic hysterectomy, bilateral salpingo-oophorectomy, sentinel lymph node biopsies, cystoscopy on 24.  Post-op course normal, discharged home on POD#1.  Follow up in 2 weeks. Discharge instructions reviewed and questions answered.    Course of patient: normal  POD#1: Hgb wnl; discharged home     Discharge to: Home    Readmission planned: No    Recommendations on Discharge:     Medications:     Medication List        START taking these medications      enoxaparin 40 MG/0.4ML  Commonly known as: LOVENOX  Inject 0.4 mLs into the skin daily for 28 days     ibuprofen 600 MG tablet  Commonly known as: ADVIL;MOTRIN  Take 1 tablet by mouth every 6 hours as needed for Pain     ondansetron 4 MG tablet  Commonly known as: ZOFRAN  Take 1 tablet by mouth daily as needed for Nausea or Vomiting     oxyCODONE 5 MG immediate release tablet  Commonly

## 2024-04-23 NOTE — PROGRESS NOTES
CLINICAL PHARMACY NOTE: MEDS TO BEDS    Total # of Prescriptions Filled: 7   The following medications were delivered to the patient:  Senexon-s  Oxycodone  Acetaminophen ES  Ibuprofen  Ondansetron  Simethicone  Enoxaparin 40 mg/ 0.4    Additional Documentation: Patient picked up at pharmacy

## 2024-04-23 NOTE — CARE COORDINATION
Case Management Assessment  Initial Evaluation    Date/Time of Evaluation: 4/23/2024 10:13 AM  Assessment Completed by: JERZY OROURKE RN    If patient is discharged prior to next notation, then this note serves as note for discharge by case management.    Patient Name: Betsy Wells                   YOB: 1960  Diagnosis: Endometrial cancer (HCC) [C54.1]  Post-operative state [Z98.890]                   Date / Time: 4/22/2024  5:47 AM    Patient Admission Status: Outpatient in a bed   Readmission Risk (Low < 19, Mod (19-27), High > 27): No data recorded  Current PCP: Anca Rangel, APRN - CNP  PCP verified by CM?      Chart Reviewed: Yes      History Provided by: (P) Patient  Patient Orientation: (P) Alert and Oriented, Person, Situation, Self    Patient Cognition: (P) Alert    Hospitalization in the last 30 days (Readmission):  No    If yes, Readmission Assessment in  Navigator will be completed.    Advance Directives:      Code Status: Full Code   Patient's Primary Decision Maker is: (P) Legal Next of Kin    Primary Decision Maker: William Wells - Spouse - [phone]    Discharge Planning:    Patient lives with:   Type of Home:    Primary Care Giver: (P) Self  Patient Support Systems include: (P) Spouse/Significant Other   Current Financial resources:    Current community resources:    Current services prior to admission:              Current DME:              Type of Home Care services:  (P) None    ADLS  Prior functional level:    Current functional level:      PT AM-PAC:   /24  OT AM-PAC:   /24    Family can provide assistance at DC:    Would you like Case Management to discuss the discharge plan with any other family members/significant others, and if so, who?    Plans to Return to Present Housing:    Other Identified Issues/Barriers to RETURNING to current housing: No  Potential Assistance needed at discharge: (P) N/A            Potential DME:    Patient expects to discharge to:

## 2024-04-23 NOTE — ANESTHESIA POSTPROCEDURE EVALUATION
Department of Anesthesiology  Postprocedure Note    Patient: Betsy Wells  MRN: [id number]  YOB: 1960  Date of evaluation: 4/23/2024    Procedure Summary       Date: 04/22/24 Room / Location: [address]    Anesthesia Start: 0736 Anesthesia Stop: 1053    Procedure: XI ROBOTIC LAPAROSCOPIC HYSTERECTOMY, BSO, ICG CERVIX DYE INJECTION, LYMPHNODE REMOVAL, CYSTOSCOPY *SHORT STAY* Diagnosis:       Endometrial cancer (HCC)      (Endometrial cancer (HCC) [C54.1])    Surgeons: Daniel Lawler MD Responsible Provider: Valerie Garcia MD    Anesthesia Type: general ASA Status: 3            Anesthesia Type: No value filed.    Flaquito Phase I: Flaquito Score: 10    Flaquito Phase II:      Anesthesia Post Evaluation    Patient location during evaluation: PACU  Patient participation: complete - patient participated  Level of consciousness: awake and alert  Pain score: 3  Airway patency: patent  Nausea & Vomiting: no nausea and no vomiting  Cardiovascular status: hemodynamically stable  Respiratory status: acceptable  Hydration status: euvolemic  Pain management: adequate    No notable events documented.

## 2024-04-24 ENCOUNTER — TELEPHONE (OUTPATIENT)
Dept: GYNECOLOGIC ONCOLOGY | Age: 64
End: 2024-04-24

## 2024-04-24 LAB — SURGICAL PATHOLOGY REPORT: NORMAL

## 2024-04-24 NOTE — TELEPHONE ENCOUNTER
Called patient to inquire on post op status:  Fever: Denies  Pain control: Pain states pain is 5/10 but is tolerable.  Taking tylenol and ibuprofen.  Reviewed medication instructions/ regimen.  Discussed non-pharm pain interventions including heat packs/ice packs and proper use: No more than 20 minutes at a time and no direct contact to skin to ensure no skin damage.  Incision(s): Patient states incisions doing well, denies having s/s of infection.  Denies having had shower since discharge.  Writer encouraged patient to shower daily to decrease infection risk.  Writer educated patient on showering instructions and importance of showering using antibacterial soap.  Writer educated on proper incision care: showering, keeping incisions clean and dry, cover as needed, and s/s of infection: increased redness, warm to touch, increased drainage, increased swelling, increased pain.  Drain: Patient reports havin cc's of drainage in 24 hours.  Patient describes drainage as: red in color.  Drain care, drainage expectations (color and amount), and drain / drainage concerns (skylar blood, foul odor, increased pain) reviewed.  Patient instructed to keep record of drainage and bring to each appointment.  /BM: Patient states urinating without difficulty.    Writer educated patient on proper fluid intake and continue use of stool softeners to prevent constipation and bearing down excessively.  Bleeding: Denies bleeding at this time. Patient educated on expected bleeding: small clots, period-like bleeding vs concerning bleeding: large clots, needing to change pad hourly.  Appetite: Patient states she is eating well.  Writer educated patient on importance of proper calorie and protein intake to aide in healing process.  Other: Patient voiced not being that comfortable with giving Lovenox injections.  Writer offered to send instructions / education, patient accepted.  Education and instructions on Lovenox injections sent to

## 2024-04-25 NOTE — RESULT ENCOUNTER NOTE
Gyn Onc Note    Spoke with patient about pathology results in detail.  I explained all the findings to her today  She has an excellent prognosis  Offered her a consultation with radiation oncology to discuss VBT for GEOVANNA involvement on path (controversial notion explained)  She does not want me to make any addl appts at this time  She says \"I will think about it and let you know if I want to see the radiation doctors.\"  She will fu as scheduled with me/us in the weeks ahead    LJ Lawler MD

## 2024-04-29 ENCOUNTER — OFFICE VISIT (OUTPATIENT)
Dept: GYNECOLOGIC ONCOLOGY | Age: 64
End: 2024-04-29

## 2024-04-29 VITALS
SYSTOLIC BLOOD PRESSURE: 160 MMHG | WEIGHT: 237 LBS | OXYGEN SATURATION: 97 % | HEIGHT: 66 IN | DIASTOLIC BLOOD PRESSURE: 98 MMHG | BODY MASS INDEX: 38.09 KG/M2 | HEART RATE: 92 BPM

## 2024-04-29 DIAGNOSIS — C54.1 ENDOMETRIAL CANCER (HCC): Primary | ICD-10-CM

## 2024-04-29 ASSESSMENT — ENCOUNTER SYMPTOMS
GASTROINTESTINAL NEGATIVE: 1
EYES NEGATIVE: 1
RESPIRATORY NEGATIVE: 1

## 2024-05-02 NOTE — PROGRESS NOTES
Review of Systems   Constitutional: Negative.    HENT:  Negative.     Eyes: Negative.    Respiratory: Negative.     Cardiovascular: Negative.    Gastrointestinal: Negative.    Endocrine: Negative.    Genitourinary: Negative.     Musculoskeletal: Negative.    Skin: Negative.    Neurological:  Positive for headaches.   Hematological: Negative.       
by carcinoma.  - Benign leiomyoma.  - Bilateral fallopian tubes with benign paratubal cysts.  - Bilateral unremarkable ovaries.  - See comment and template.    -- Diagnosis Comment --  Slides are reviewed by additional pathologists who concur with these  diagnostic findings (LJF, SPF, SLS, AG).      Shayna Jessica M.D.  **Electronically Signed Out**        kmg2/4/24/2024     Clinical Information  Pre-Op Diagnosis:  ENDOMETRIAL CANCER  Operative Findings:  BILATERAL PELVIC SENTINEL LYMPH NODES;  UTERUS,  CERVIX, BILATERAL FALLOPIAN TUBES AND BILATERAL OVARIES  Operation Performed:  XI ROBOTIC LAPAROSCOPIC HYSTERECTOMY, BSO ICG  CERVIX DYE INJECTION, LYMPH NODE REMOVAL *SHORT STAY*  kb        Source of Specimen  A: BILATERAL PELVIC SENTINEL LYMPH NODES  B: UTERUS, CERVIX, BILATERAL FALLOPIAN TUBES AND BILATERAL OVARIES      Gross Description  A.  \"JORGE TRACEY, BILATERAL SENTINEL LYMPH NODES\" Received in  formalin are fragments of fat, 6.0 x 4.0 x 1.0 cm in aggregate.  Sectioning reveals two rubbery nodes, 0.5 and 2.0 cm.  The largest  node has a rubbery cut surface.  Cassette summary:  \"1\" smaller node  intact, \"2\" largest node.     Today the patient has no c/o vaginal bleeding, discharge or pain. She does still have her SILVIA drain in, and it's draining on average 20-30 ml of serosanguinous liquid. The patient states it's slowed down a lot.    Her pathology also shows that the lower uterine segment is involved. She is on daily Lovenox.      ROS:  I have personally reviewed and agree with the review of systems done by my ancillary staff in the EPIC documentation.    Objective:  Vitals:    04/29/24 1018 04/29/24 1024   BP: (!) 180/101 (!) 160/98   Site: Left Lower Arm    Position: Sitting    Cuff Size: Medium Adult    Pulse: 92    SpO2: 97%    Weight: 107.5 kg (237 lb)    Height: 1.676 m (5' 6\")        Physical Exam:  General: well-appearing, no acute distress    SILVIA drain in plain and draining between 20-30 ml.

## 2024-05-10 ENCOUNTER — OFFICE VISIT (OUTPATIENT)
Dept: GYNECOLOGIC ONCOLOGY | Age: 64
End: 2024-05-10
Payer: MEDICARE

## 2024-05-10 VITALS
WEIGHT: 237 LBS | SYSTOLIC BLOOD PRESSURE: 156 MMHG | DIASTOLIC BLOOD PRESSURE: 97 MMHG | BODY MASS INDEX: 38.09 KG/M2 | HEART RATE: 92 BPM | OXYGEN SATURATION: 96 % | HEIGHT: 66 IN

## 2024-05-10 DIAGNOSIS — C54.1 ENDOMETRIAL CANCER (HCC): Primary | ICD-10-CM

## 2024-05-10 PROCEDURE — 1036F TOBACCO NON-USER: CPT | Performed by: NURSE PRACTITIONER

## 2024-05-10 PROCEDURE — G8427 DOCREV CUR MEDS BY ELIG CLIN: HCPCS | Performed by: NURSE PRACTITIONER

## 2024-05-10 PROCEDURE — 3017F COLORECTAL CA SCREEN DOC REV: CPT | Performed by: NURSE PRACTITIONER

## 2024-05-10 PROCEDURE — G8417 CALC BMI ABV UP PARAM F/U: HCPCS | Performed by: NURSE PRACTITIONER

## 2024-05-10 PROCEDURE — 1090F PRES/ABSN URINE INCON ASSESS: CPT | Performed by: NURSE PRACTITIONER

## 2024-05-10 PROCEDURE — 1123F ACP DISCUSS/DSCN MKR DOCD: CPT | Performed by: NURSE PRACTITIONER

## 2024-05-10 PROCEDURE — 99213 OFFICE O/P EST LOW 20 MIN: CPT | Performed by: NURSE PRACTITIONER

## 2024-05-10 PROCEDURE — G8399 PT W/DXA RESULTS DOCUMENT: HCPCS | Performed by: NURSE PRACTITIONER

## 2024-05-13 NOTE — PROGRESS NOTES
Review of Systems   Constitutional: Negative.    HENT:  Negative.     Eyes: Negative.    Respiratory: Negative.     Cardiovascular: Negative.    Gastrointestinal: Negative.    Endocrine: Negative.    Genitourinary:  Positive for pelvic pain.    Musculoskeletal: Negative.    Skin: Negative.    Neurological: Negative.    Hematological: Negative.       
Endometrial endometrioid adenocarcinoma, FIGO grade 1 (of 3),  invading less than 50% of myometrium and involving lower uterine  segment.  - Cervix with multiple nabothian cysts, negative for carcinoma.  - Adenomyosis, uninvolved by carcinoma.  - Benign leiomyoma.  - Bilateral fallopian tubes with benign paratubal cysts.  - Bilateral unremarkable ovaries.  - See comment and template.    -- Diagnosis Comment --  Slides are reviewed by additional pathologists who concur with these  diagnostic findings (SARA, SPF, SLS, AG).      Shayna Jessica M.D.  **Electronically Signed Out**        kmg2/4/24/2024     Clinical Information  Pre-Op Diagnosis:  ENDOMETRIAL CANCER  Operative Findings:  BILATERAL PELVIC SENTINEL LYMPH NODES;  UTERUS,  CERVIX, BILATERAL FALLOPIAN TUBES AND BILATERAL OVARIES  Operation Performed:  XI ROBOTIC LAPAROSCOPIC HYSTERECTOMY, BSO ICG  CERVIX DYE INJECTION, LYMPH NODE REMOVAL *SHORT STAY*  kb        Source of Specimen  A: BILATERAL PELVIC SENTINEL LYMPH NODES  B: UTERUS, CERVIX, BILATERAL FALLOPIAN TUBES AND BILATERAL OVARIES    Path Number: [id number]    -- Diagnosis --  A. BILATERAL PELVIC SENTINEL LYMPH NODES, DISSECTION:  Two lymph  nodes, negative for carcinoma (0/2). Pankeratin is confirmatory.     B. UTERUS, CERVIX, BILATERAL FALLOPIAN TUBES AND BILATERAL OVARIES,  HYSTERECTOMY AND BILATERAL SALPINGO-OOPHORECTOMY:    - Endometrial endometrioid adenocarcinoma, FIGO grade 1 (of 3),  invading less than 50% of myometrium and involving lower uterine  segment.  - Cervix with multiple nabothian cysts, negative for carcinoma.  - Adenomyosis, uninvolved by carcinoma.  - Benign leiomyoma.  - Bilateral fallopian tubes with benign paratubal cysts.  - Bilateral unremarkable ovaries.  - See comment and template.    -- Diagnosis Comment --  Slides are reviewed by additional pathologists who concur with these  diagnostic findings (SARA, SPF, SLS, AG).      Shayna Jessica M.D.  **Electronically

## 2024-05-21 LAB — NONINV COLON CA DNA+OCC BLD SCRN STL QL: NEGATIVE

## 2024-05-22 PROBLEM — Z98.890 POST-OPERATIVE STATE: Status: RESOLVED | Noted: 2024-04-22 | Resolved: 2024-05-22

## 2024-06-06 ENCOUNTER — PATIENT MESSAGE (OUTPATIENT)
Dept: INTERNAL MEDICINE CLINIC | Age: 64
End: 2024-06-06

## 2024-06-06 DIAGNOSIS — I10 HYPERTENSION GOAL BP (BLOOD PRESSURE) < 130/80: Primary | ICD-10-CM

## 2024-06-07 RX ORDER — LISINOPRIL 20 MG/1
20 TABLET ORAL DAILY
Qty: 90 TABLET | Refills: 0 | Status: SHIPPED | OUTPATIENT
Start: 2024-06-07

## 2024-06-07 NOTE — TELEPHONE ENCOUNTER
Will increase lisinopril to 20 mg but please ask her to schedule f/u appt within the next month or two. She does not have f/u scheduled at present.  Orders Placed This Encounter   Medications    lisinopril (PRINIVIL;ZESTRIL) 20 MG tablet     Sig: Take 1 tablet by mouth daily     Dispense:  90 tablet     Refill:  0

## 2024-08-06 ENCOUNTER — OFFICE VISIT (OUTPATIENT)
Dept: INTERNAL MEDICINE CLINIC | Age: 64
End: 2024-08-06
Payer: MEDICARE

## 2024-08-06 VITALS
WEIGHT: 242 LBS | DIASTOLIC BLOOD PRESSURE: 84 MMHG | HEART RATE: 74 BPM | HEIGHT: 66 IN | BODY MASS INDEX: 38.89 KG/M2 | OXYGEN SATURATION: 99 % | SYSTOLIC BLOOD PRESSURE: 126 MMHG

## 2024-08-06 DIAGNOSIS — I10 HYPERTENSION GOAL BP (BLOOD PRESSURE) < 130/80: Primary | ICD-10-CM

## 2024-08-06 DIAGNOSIS — E66.9 OBESITY (BMI 30-39.9): ICD-10-CM

## 2024-08-06 DIAGNOSIS — E78.5 HYPERLIPIDEMIA, UNSPECIFIED HYPERLIPIDEMIA TYPE: ICD-10-CM

## 2024-08-06 DIAGNOSIS — C54.1 ENDOMETRIAL CANCER (HCC): ICD-10-CM

## 2024-08-06 PROBLEM — Z98.890 POSTOPERATIVE STATE: Status: RESOLVED | Noted: 2024-04-22 | Resolved: 2024-08-06

## 2024-08-06 PROCEDURE — 3074F SYST BP LT 130 MM HG: CPT | Performed by: NURSE PRACTITIONER

## 2024-08-06 PROCEDURE — 1123F ACP DISCUSS/DSCN MKR DOCD: CPT | Performed by: NURSE PRACTITIONER

## 2024-08-06 PROCEDURE — G8399 PT W/DXA RESULTS DOCUMENT: HCPCS | Performed by: NURSE PRACTITIONER

## 2024-08-06 PROCEDURE — 1036F TOBACCO NON-USER: CPT | Performed by: NURSE PRACTITIONER

## 2024-08-06 PROCEDURE — 1090F PRES/ABSN URINE INCON ASSESS: CPT | Performed by: NURSE PRACTITIONER

## 2024-08-06 PROCEDURE — G8417 CALC BMI ABV UP PARAM F/U: HCPCS | Performed by: NURSE PRACTITIONER

## 2024-08-06 PROCEDURE — G8427 DOCREV CUR MEDS BY ELIG CLIN: HCPCS | Performed by: NURSE PRACTITIONER

## 2024-08-06 PROCEDURE — 3079F DIAST BP 80-89 MM HG: CPT | Performed by: NURSE PRACTITIONER

## 2024-08-06 PROCEDURE — 99214 OFFICE O/P EST MOD 30 MIN: CPT | Performed by: NURSE PRACTITIONER

## 2024-08-06 PROCEDURE — 3017F COLORECTAL CA SCREEN DOC REV: CPT | Performed by: NURSE PRACTITIONER

## 2024-08-06 RX ORDER — UBIDECARENONE 50 MG
600 CAPSULE ORAL 2 TIMES DAILY
COMMUNITY

## 2024-08-06 RX ORDER — AMOXICILLIN 500 MG
1200 CAPSULE ORAL ONCE
COMMUNITY

## 2024-08-06 RX ORDER — PHENOL 1.4 %
1 AEROSOL, SPRAY (ML) MUCOUS MEMBRANE DAILY
COMMUNITY

## 2024-08-06 RX ORDER — LISINOPRIL 20 MG/1
20 TABLET ORAL DAILY
Qty: 90 TABLET | Refills: 3 | Status: SHIPPED | OUTPATIENT
Start: 2024-08-06

## 2024-08-06 NOTE — PROGRESS NOTES
Visit Information    Have you changed or started any medications since your last visit including any over-the-counter medicines, vitamins, or herbal medicines? no   Are you having any side effects from any of your medications? -  no  Have you stopped taking any of your medications? Is so, why? -  no    Have you seen any other physician or provider since your last visit? Yes - Records Obtained  Have you had any other diagnostic tests since your last visit? Yes - Records Obtained  Have you been seen in the emergency room and/or had an admission to a hospital since we last saw you? Yes - Records Obtained  Have you had your routine dental cleaning in the past 6 months? no    Have you activated your [employer] account? If not, what are your barriers? Yes     Patient Care Team:  Anca Rangel APRN - CNP as PCP - General (Internal Medicine)  Anca Rangel APRN - CNP as PCP - Empaneled Provider    Medical History Review  Past Medical, Family, and Social History reviewed and does contribute to the patient presenting condition    Health Maintenance   Topic Date Due    DTaP/Tdap/Td vaccine (1 - Tdap) Never done    Respiratory Syncytial Virus (RSV) Pregnant or age 60 yrs+ (1 - 1-dose 60+ series) Never done    COVID-19 Vaccine (3 - 2023-24 season) 09/01/2023    Flu vaccine (1) 08/01/2024    Depression Screen  03/02/2025    Breast cancer screen  02/07/2026    Diabetes screen  02/06/2027    Colorectal Cancer Screen  05/14/2027    Lipids  02/06/2029    DEXA (modify frequency per FRAX score)  Completed    Annual Wellness Visit (Medicare Advantage)  Completed    Shingles vaccine  Completed    Pneumococcal 65+ years Vaccine  Completed    Hepatitis C screen  Completed    Hepatitis A vaccine  Aged Out    Hepatitis B vaccine  Aged Out    Hib vaccine  Aged Out    Polio vaccine  Aged Out    Meningococcal (ACWY) vaccine  Aged Out    Pneumococcal 0-64 years Vaccine  Discontinued    HIV screen  Discontinued    Cervical cancer screen

## 2024-08-20 ENCOUNTER — TELEPHONE (OUTPATIENT)
Dept: INTERNAL MEDICINE CLINIC | Age: 64
End: 2024-08-20

## 2024-08-20 DIAGNOSIS — Z01.00 VISION TEST: Primary | ICD-10-CM

## 2024-08-20 ASSESSMENT — ENCOUNTER SYMPTOMS
ABDOMINAL PAIN: 0
COLOR CHANGE: 0
WHEEZING: 0
SHORTNESS OF BREATH: 0
TROUBLE SWALLOWING: 0
DIARRHEA: 0
COUGH: 0
EYE DISCHARGE: 0
CONSTIPATION: 0

## 2024-09-03 ENCOUNTER — OFFICE VISIT (OUTPATIENT)
Dept: GYNECOLOGIC ONCOLOGY | Age: 64
End: 2024-09-03
Payer: MEDICARE

## 2024-09-03 VITALS
OXYGEN SATURATION: 98 % | TEMPERATURE: 97.7 F | WEIGHT: 244.8 LBS | SYSTOLIC BLOOD PRESSURE: 124 MMHG | BODY MASS INDEX: 39.51 KG/M2 | DIASTOLIC BLOOD PRESSURE: 86 MMHG | HEART RATE: 80 BPM

## 2024-09-03 DIAGNOSIS — C54.1 ENDOMETRIAL CA (HCC): Primary | ICD-10-CM

## 2024-09-03 PROCEDURE — 1036F TOBACCO NON-USER: CPT | Performed by: OBSTETRICS & GYNECOLOGY

## 2024-09-03 PROCEDURE — 1123F ACP DISCUSS/DSCN MKR DOCD: CPT | Performed by: OBSTETRICS & GYNECOLOGY

## 2024-09-03 PROCEDURE — 99213 OFFICE O/P EST LOW 20 MIN: CPT | Performed by: OBSTETRICS & GYNECOLOGY

## 2024-09-03 PROCEDURE — G8399 PT W/DXA RESULTS DOCUMENT: HCPCS | Performed by: OBSTETRICS & GYNECOLOGY

## 2024-09-03 PROCEDURE — 3017F COLORECTAL CA SCREEN DOC REV: CPT | Performed by: OBSTETRICS & GYNECOLOGY

## 2024-09-03 PROCEDURE — G8427 DOCREV CUR MEDS BY ELIG CLIN: HCPCS | Performed by: OBSTETRICS & GYNECOLOGY

## 2024-09-03 PROCEDURE — 1090F PRES/ABSN URINE INCON ASSESS: CPT | Performed by: OBSTETRICS & GYNECOLOGY

## 2024-09-03 PROCEDURE — G8417 CALC BMI ABV UP PARAM F/U: HCPCS | Performed by: OBSTETRICS & GYNECOLOGY

## 2024-09-03 ASSESSMENT — ENCOUNTER SYMPTOMS
VOICE CHANGE: 0
BACK PAIN: 0
SORE THROAT: 0
ABDOMINAL PAIN: 0
NAUSEA: 0
CHEST TIGHTNESS: 0
EYE PROBLEMS: 0
WHEEZING: 0
BLOOD IN STOOL: 0
TROUBLE SWALLOWING: 0
ABDOMINAL DISTENTION: 0
RECTAL PAIN: 0
DIARRHEA: 0
SHORTNESS OF BREATH: 0
COUGH: 0
VOMITING: 0
HEMOPTYSIS: 0
CONSTIPATION: 0
SCLERAL ICTERUS: 0

## 2024-09-03 NOTE — PROGRESS NOTES
[facility]  [address]  [place] [address]    Betsy Wells present for her endometrial cancer surveillance visit.     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       Final pathology revealed Stage 1A grade 1 endometrioid adenocarcinoma.    Today, she reports doing well with no concerns. She denies any pelvic pain, discharge, or vaginal bleeding.         ROS:  I have personally reviewed and agree with the review of systems done by my ancillary staff in the EPIC documentation.      Past Medical History:   Diagnosis Date    Abnormal Pap smear of cervix     Long long ago    Abnormal uterine bleeding (AUB)     Endocrine cancer (HCC)     Hyperlipidemia     Hypertension     Squamous cell cancer of skin of upper arm     follows with derm in [place]    Under care of service provider 04/15/2024    pcp-leonela caro-[facility] [phone]-last visit feb 2024    Under care of service provider 04/15/2024    ob/gyn-elma lang-[facility] obstetrics [phone]-last visit march 2024    Under care of service provider 04/15/2024    oncology ob/gyn--jose/ptycbh-[phone]-last visit march 2024         Past Surgical History:   Procedure Laterality Date    BREAST BIOPSY Right     has a marker    COLONOSCOPY      DILATION AND CURETTAGE OF UTERUS N/A 03/22/2024    DILATATION AND CURETTAGE HYSTEROSCOPY W/MYOSURE performed by Rocío Lang DO at [facility] OR    HYSTERECTOMY (CERVIX STATUS UNKNOWN) N/A 4/22/2024    XI ROBOTIC LAPAROSCOPIC HYSTERECTOMY, BSO, ICG CERVIX DYE INJECTION, LYMPHNODE REMOVAL, CYSTOSCOPY *SHORT STAY* performed by Daniel Lawler MD at [facility] OR    LAPAROSCOPIC HYSTERECTOMY  04/22/2024    XI ROBOTIC LAPAROSCOPIC HYSTERECTOMY, BSO,

## 2024-09-03 NOTE — PROGRESS NOTES
Gyn Onc Note    Patient seen and evaluated by me today  Chart notes records labs path and history revd  I spent 20 minutes today in the office managing this case  Exam today is normal  No masses or lesions seen or felt today    Plan:    History of early stage EC  MARLENA today  FU six months or sooner prn      LJ Lawler MD

## 2024-09-03 NOTE — PROGRESS NOTES
Review of Systems   Constitutional:  Negative for appetite change, chills, diaphoresis, fatigue, fever and unexpected weight change.   HENT:   Negative for hearing loss, lump/mass, mouth sores, nosebleeds, sore throat, tinnitus, trouble swallowing and voice change.    Eyes:  Negative for eye problems and icterus.   Respiratory:  Negative for chest tightness, cough, hemoptysis, shortness of breath and wheezing.    Cardiovascular:  Negative for chest pain, leg swelling and palpitations.   Gastrointestinal:  Negative for abdominal distention, abdominal pain, blood in stool, constipation, diarrhea, nausea, rectal pain and vomiting.   Endocrine: Negative for hot flashes.   Genitourinary:  Negative for bladder incontinence, difficulty urinating, dyspareunia, dysuria, frequency, hematuria, menstrual problem, nocturia, pelvic pain, vaginal bleeding and vaginal discharge.    Musculoskeletal:  Negative for arthralgias, back pain, flank pain, gait problem, myalgias, neck pain and neck stiffness.   Skin:  Negative for itching, rash and wound.   Neurological:  Negative for dizziness, extremity weakness, gait problem, headaches, light-headedness, numbness, seizures and speech difficulty.   Hematological:  Negative for adenopathy. Does not bruise/bleed easily.   Psychiatric/Behavioral:  Negative for confusion, decreased concentration, depression, sleep disturbance and suicidal ideas. The patient is not nervous/anxious.

## 2024-12-30 ENCOUNTER — TELEPHONE (OUTPATIENT)
Dept: INTERNAL MEDICINE CLINIC | Age: 64
End: 2024-12-30

## 2024-12-30 DIAGNOSIS — Z01.00 EYE EXAM, ROUTINE: Primary | ICD-10-CM

## 2024-12-30 NOTE — TELEPHONE ENCOUNTER
LV 8/6/2024      Patient is in need of referral to be able to go [facility].    Patient does have an appt tomorrow with them.    Fax [phone]    Please advise

## 2025-01-17 ENCOUNTER — TELEPHONE (OUTPATIENT)
Dept: INTERNAL MEDICINE CLINIC | Age: 65
End: 2025-01-17

## 2025-01-17 ENCOUNTER — APPOINTMENT (OUTPATIENT)
Dept: CT IMAGING | Age: 65
DRG: 379 | End: 2025-01-17
Payer: MEDICARE

## 2025-01-17 ENCOUNTER — HOSPITAL ENCOUNTER (INPATIENT)
Age: 65
LOS: 2 days | Discharge: HOME | DRG: 379 | End: 2025-01-19
Attending: EMERGENCY MEDICINE | Admitting: INTERNAL MEDICINE
Payer: MEDICARE

## 2025-01-17 DIAGNOSIS — K92.2 GASTROINTESTINAL HEMORRHAGE, UNSPECIFIED GASTROINTESTINAL HEMORRHAGE TYPE: ICD-10-CM

## 2025-01-17 DIAGNOSIS — K62.5 RECTAL BLEEDING: Primary | ICD-10-CM

## 2025-01-17 LAB
ABO + RH BLD: NORMAL
ALBUMIN SERPL-MCNC: 4.2 G/DL (ref 3.5–5.2)
ALP SERPL-CCNC: 87 U/L (ref 35–104)
ALT SERPL-CCNC: 45 U/L (ref 10–35)
ANION GAP SERPL CALCULATED.3IONS-SCNC: 11 MMOL/L (ref 9–16)
ARM BAND NUMBER: NORMAL
AST SERPL-CCNC: 25 U/L (ref 10–35)
BASOPHILS # BLD: 0 K/UL (ref 0–0.2)
BASOPHILS NFR BLD: 1 % (ref 0–2)
BILIRUB SERPL-MCNC: 0.3 MG/DL (ref 0–1.2)
BLOOD BANK SAMPLE EXPIRATION: NORMAL
BLOOD GROUP ANTIBODIES SERPL: NEGATIVE
BUN SERPL-MCNC: 32 MG/DL (ref 8–23)
CALCIUM SERPL-MCNC: 9.6 MG/DL (ref 8.6–10.4)
CHLORIDE SERPL-SCNC: 106 MMOL/L (ref 98–107)
CO2 SERPL-SCNC: 24 MMOL/L (ref 20–31)
CREAT SERPL-MCNC: 0.8 MG/DL (ref 0.7–1.2)
DATE, STOOL #1: ABNORMAL
EOSINOPHIL # BLD: 0 K/UL (ref 0–0.4)
EOSINOPHILS RELATIVE PERCENT: 1 % (ref 0–4)
ERYTHROCYTE [DISTWIDTH] IN BLOOD BY AUTOMATED COUNT: 13.7 % (ref 11.5–14.9)
GFR, ESTIMATED: 82 ML/MIN/1.73M2
GLUCOSE SERPL-MCNC: 114 MG/DL (ref 74–99)
HCT VFR BLD AUTO: 30.7 % (ref 36–46)
HCT VFR BLD AUTO: 35.1 % (ref 36–46)
HEMOCCULT SP1 STL QL: POSITIVE
HGB BLD-MCNC: 10.6 G/DL (ref 12–16)
HGB BLD-MCNC: 12.1 G/DL (ref 12–16)
INR PPP: 1
LYMPHOCYTES NFR BLD: 1.7 K/UL (ref 1–4.8)
LYMPHOCYTES RELATIVE PERCENT: 21 % (ref 24–44)
MCH RBC QN AUTO: 29.4 PG (ref 26–34)
MCHC RBC AUTO-ENTMCNC: 34.4 G/DL (ref 31–37)
MCV RBC AUTO: 85.3 FL (ref 80–100)
MONOCYTES NFR BLD: 0.4 K/UL (ref 0.1–1.3)
MONOCYTES NFR BLD: 5 % (ref 1–7)
NEUTROPHILS NFR BLD: 72 % (ref 36–66)
NEUTS SEG NFR BLD: 5.8 K/UL (ref 1.3–9.1)
PARTIAL THROMBOPLASTIN TIME: 24.1 SEC (ref 24–36)
PLATELET # BLD AUTO: 295 K/UL (ref 150–450)
PMV BLD AUTO: 7.7 FL (ref 6–12)
POTASSIUM SERPL-SCNC: 4.1 MMOL/L (ref 3.7–5.3)
PROT SERPL-MCNC: 6.6 G/DL (ref 6.6–8.7)
PROTHROMBIN TIME: 14.3 SEC (ref 11.8–14.6)
RBC # BLD AUTO: 4.11 M/UL (ref 4–5.2)
SODIUM SERPL-SCNC: 141 MMOL/L (ref 136–145)
TIME, STOOL #1: 1046
WBC OTHER # BLD: 7.9 K/UL (ref 3.5–11)

## 2025-01-17 PROCEDURE — 85610 PROTHROMBIN TIME: CPT

## 2025-01-17 PROCEDURE — 85025 COMPLETE CBC W/AUTO DIFF WBC: CPT

## 2025-01-17 PROCEDURE — 86803 HEPATITIS C AB TEST: CPT

## 2025-01-17 PROCEDURE — 6360000004 HC RX CONTRAST MEDICATION: Performed by: EMERGENCY MEDICINE

## 2025-01-17 PROCEDURE — 83516 IMMUNOASSAY NONANTIBODY: CPT

## 2025-01-17 PROCEDURE — 86317 IMMUNOASSAY INFECTIOUS AGENT: CPT

## 2025-01-17 PROCEDURE — 86225 DNA ANTIBODY NATIVE: CPT

## 2025-01-17 PROCEDURE — 86704 HEP B CORE ANTIBODY TOTAL: CPT

## 2025-01-17 PROCEDURE — 82270 OCCULT BLOOD FECES: CPT

## 2025-01-17 PROCEDURE — 80053 COMPREHEN METABOLIC PANEL: CPT

## 2025-01-17 PROCEDURE — 86900 BLOOD TYPING SEROLOGIC ABO: CPT

## 2025-01-17 PROCEDURE — 99285 EMERGENCY DEPT VISIT HI MDM: CPT

## 2025-01-17 PROCEDURE — 86038 ANTINUCLEAR ANTIBODIES: CPT

## 2025-01-17 PROCEDURE — 86850 RBC ANTIBODY SCREEN: CPT

## 2025-01-17 PROCEDURE — 85018 HEMOGLOBIN: CPT

## 2025-01-17 PROCEDURE — 87340 HEPATITIS B SURFACE AG IA: CPT

## 2025-01-17 PROCEDURE — 85730 THROMBOPLASTIN TIME PARTIAL: CPT

## 2025-01-17 PROCEDURE — 86901 BLOOD TYPING SEROLOGIC RH(D): CPT

## 2025-01-17 PROCEDURE — 99223 1ST HOSP IP/OBS HIGH 75: CPT | Performed by: INTERNAL MEDICINE

## 2025-01-17 PROCEDURE — 6360000002 HC RX W HCPCS

## 2025-01-17 PROCEDURE — 2580000003 HC RX 258

## 2025-01-17 PROCEDURE — 2500000003 HC RX 250 WO HCPCS: Performed by: EMERGENCY MEDICINE

## 2025-01-17 PROCEDURE — 74174 CTA ABD&PLVS W/CONTRAST: CPT

## 2025-01-17 PROCEDURE — 2060000000 HC ICU INTERMEDIATE R&B

## 2025-01-17 PROCEDURE — 36415 COLL VENOUS BLD VENIPUNCTURE: CPT

## 2025-01-17 PROCEDURE — 85014 HEMATOCRIT: CPT

## 2025-01-17 PROCEDURE — 2580000003 HC RX 258: Performed by: EMERGENCY MEDICINE

## 2025-01-17 RX ORDER — MAGNESIUM SULFATE HEPTAHYDRATE 40 MG/ML
2000 INJECTION, SOLUTION INTRAVENOUS PRN
Status: DISCONTINUED | OUTPATIENT
Start: 2025-01-17 | End: 2025-01-19 | Stop reason: HOSPADM

## 2025-01-17 RX ORDER — SODIUM CHLORIDE 0.9 % (FLUSH) 0.9 %
5-40 SYRINGE (ML) INJECTION EVERY 12 HOURS SCHEDULED
Status: DISCONTINUED | OUTPATIENT
Start: 2025-01-17 | End: 2025-01-19 | Stop reason: HOSPADM

## 2025-01-17 RX ORDER — SODIUM CHLORIDE 0.9 % (FLUSH) 0.9 %
5-40 SYRINGE (ML) INJECTION PRN
Status: DISCONTINUED | OUTPATIENT
Start: 2025-01-17 | End: 2025-01-19 | Stop reason: HOSPADM

## 2025-01-17 RX ORDER — POTASSIUM CHLORIDE 1500 MG/1
40 TABLET, EXTENDED RELEASE ORAL PRN
Status: DISCONTINUED | OUTPATIENT
Start: 2025-01-17 | End: 2025-01-19 | Stop reason: HOSPADM

## 2025-01-17 RX ORDER — LATANOPROST 50 UG/ML
1 SOLUTION/ DROPS OPHTHALMIC NIGHTLY
Status: ON HOLD | COMMUNITY
End: 2025-01-19 | Stop reason: HOSPADM

## 2025-01-17 RX ORDER — LISINOPRIL 20 MG/1
20 TABLET ORAL DAILY
Status: DISCONTINUED | OUTPATIENT
Start: 2025-01-17 | End: 2025-01-19 | Stop reason: HOSPADM

## 2025-01-17 RX ORDER — SODIUM CHLORIDE 9 MG/ML
INJECTION, SOLUTION INTRAVENOUS CONTINUOUS
Status: ACTIVE | OUTPATIENT
Start: 2025-01-17 | End: 2025-01-18

## 2025-01-17 RX ORDER — IBUPROFEN 200 MG
200 TABLET ORAL EVERY 6 HOURS PRN
Status: ON HOLD | COMMUNITY
End: 2025-01-19 | Stop reason: HOSPADM

## 2025-01-17 RX ORDER — ACETAMINOPHEN 325 MG/1
650 TABLET ORAL EVERY 6 HOURS PRN
Status: DISCONTINUED | OUTPATIENT
Start: 2025-01-17 | End: 2025-01-19 | Stop reason: HOSPADM

## 2025-01-17 RX ORDER — ONDANSETRON 2 MG/ML
4 INJECTION INTRAMUSCULAR; INTRAVENOUS EVERY 6 HOURS PRN
Status: DISCONTINUED | OUTPATIENT
Start: 2025-01-17 | End: 2025-01-19 | Stop reason: HOSPADM

## 2025-01-17 RX ORDER — SODIUM CHLORIDE 0.9 % (FLUSH) 0.9 %
10 SYRINGE (ML) INJECTION PRN
Status: DISCONTINUED | OUTPATIENT
Start: 2025-01-17 | End: 2025-01-19 | Stop reason: HOSPADM

## 2025-01-17 RX ORDER — SODIUM CHLORIDE 9 MG/ML
INJECTION, SOLUTION INTRAVENOUS PRN
Status: DISCONTINUED | OUTPATIENT
Start: 2025-01-17 | End: 2025-01-19 | Stop reason: HOSPADM

## 2025-01-17 RX ORDER — ACETAMINOPHEN 650 MG/1
650 SUPPOSITORY RECTAL EVERY 6 HOURS PRN
Status: DISCONTINUED | OUTPATIENT
Start: 2025-01-17 | End: 2025-01-19 | Stop reason: HOSPADM

## 2025-01-17 RX ORDER — 0.9 % SODIUM CHLORIDE 0.9 %
100 INTRAVENOUS SOLUTION INTRAVENOUS ONCE
Status: COMPLETED | OUTPATIENT
Start: 2025-01-17 | End: 2025-01-17

## 2025-01-17 RX ORDER — IOPAMIDOL 755 MG/ML
100 INJECTION, SOLUTION INTRAVASCULAR
Status: COMPLETED | OUTPATIENT
Start: 2025-01-17 | End: 2025-01-17

## 2025-01-17 RX ORDER — POTASSIUM CHLORIDE 7.45 MG/ML
10 INJECTION INTRAVENOUS PRN
Status: DISCONTINUED | OUTPATIENT
Start: 2025-01-17 | End: 2025-01-19 | Stop reason: HOSPADM

## 2025-01-17 RX ORDER — ONDANSETRON 4 MG/1
4 TABLET, ORALLY DISINTEGRATING ORAL EVERY 8 HOURS PRN
Status: DISCONTINUED | OUTPATIENT
Start: 2025-01-17 | End: 2025-01-19 | Stop reason: HOSPADM

## 2025-01-17 RX ORDER — POLYETHYLENE GLYCOL 3350 17 G/17G
17 POWDER, FOR SOLUTION ORAL DAILY PRN
Status: DISCONTINUED | OUTPATIENT
Start: 2025-01-17 | End: 2025-01-19 | Stop reason: HOSPADM

## 2025-01-17 RX ADMIN — SODIUM CHLORIDE, PRESERVATIVE FREE 10 ML: 5 INJECTION INTRAVENOUS at 14:40

## 2025-01-17 RX ADMIN — SODIUM CHLORIDE: 9 INJECTION, SOLUTION INTRAVENOUS at 14:38

## 2025-01-17 RX ADMIN — IOPAMIDOL 100 ML: 755 INJECTION, SOLUTION INTRAVENOUS at 11:11

## 2025-01-17 RX ADMIN — SODIUM CHLORIDE, PRESERVATIVE FREE 40 MG: 5 INJECTION INTRAVENOUS at 14:46

## 2025-01-17 RX ADMIN — SODIUM CHLORIDE, PRESERVATIVE FREE 10 ML: 5 INJECTION INTRAVENOUS at 11:11

## 2025-01-17 RX ADMIN — SODIUM CHLORIDE 100 ML: 9 INJECTION, SOLUTION INTRAVENOUS at 11:11

## 2025-01-17 ASSESSMENT — LIFESTYLE VARIABLES
HOW OFTEN DO YOU HAVE A DRINK CONTAINING ALCOHOL: NEVER
HOW MANY STANDARD DRINKS CONTAINING ALCOHOL DO YOU HAVE ON A TYPICAL DAY: PATIENT DOES NOT DRINK

## 2025-01-17 NOTE — H&P
[facility]  IN-PATIENT SERVICE  [facility]  IN-PATIENT SERVICE   [facility]     HISTORY AND PHYSICAL EXAMINATION            Date:   1/17/2025  Patient name:  Betsy Wells  Date of admission:  1/17/2025  9:39 AM  MRN:   [id number]  Account:  [id number]  YOB: 1960  PCP:    Leonela Caro APRN - CNP  Room:   [address] [place] [address]  Code Status:    Prior    Chief Complaint:     Chief Complaint   Patient presents with    Rectal Bleeding     History Obtained From:     Patient, electronic medical records    History of Present Illness:     The patient is a 64 y.o. female who presented 01/17/2025 with painless bright red bleeding per rectum for which she was admitted.    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    The patient was in her usual state of health when 01/16/2025, in the evening, the patient had 5 bloody stools and noticed small back pain that was new for her; she was also lightheaded. She denies any such episodes before.    In the ER, hemoglobin was 12.1, cr 0.8, BUN 32. CTA abdomen and pelvis with contrast showed no evidence for active GI bleed.  She was given IV fluids and admitted with the plan for possible endoscopy next day.    Past Medical History:     Past Medical History:   Diagnosis Date    Abnormal Pap smear of cervix     Long long ago    Abnormal uterine bleeding (AUB)     Endocrine cancer (HCC)     Hyperlipidemia     Hypertension     Squamous cell cancer of skin of upper arm     follows with derm in [place]    Under care of service provider 04/15/2024    pcp-leonela caro-[facility] [phone]-last visit feb 2024    Under care of service provider 04/15/2024    [phone][facility]last visit march 2024    Under care of  allergies.    Social History:     Tobacco:    reports that she has never smoked. She has never used smokeless tobacco.  Alcohol:      reports no history of alcohol use.  Drug Use:  reports no history of drug use.    Family History:     Family History   Problem Relation Age of Onset    Coronary Art Dis Mother     Heart Disease Father 32    Diabetes Maternal Grandmother        Review of Systems:     Positive and Negative as described in HPI.    Review of Systems   Constitutional:  Negative for chills and fever.   Respiratory:  Negative for chest tightness and shortness of breath.    Cardiovascular:  Negative for palpitations.   Gastrointestinal:  Negative for abdominal pain and constipation.   Neurological:  Positive for dizziness and light-headedness.   Psychiatric/Behavioral:  Negative for confusion.        Physical Exam:   BP (!) 134/58   Pulse (!) 102   Temp 98.4 °F (36.9 °C)   Resp 18   Ht 1.676 m (5' 6\")   Wt 104.3 kg (230 lb)   LMP  (LMP Unknown)   SpO2 97%   BMI 37.12 kg/m²   Temp (24hrs), Av.4 °F (36.9 °C), Min:98.4 °F (36.9 °C), Max:98.4 °F (36.9 °C)    No results for input(s): \"POCGLU\" in the last 72 hours.  No intake or output data in the 24 hours ending 25 1245    Physical Exam  Constitutional:       Appearance: She is not ill-appearing.   Eyes:      Extraocular Movements: Extraocular movements intact.   Cardiovascular:      Rate and Rhythm: Normal rate.      Heart sounds: Normal heart sounds.   Pulmonary:      Breath sounds: Normal breath sounds.   Abdominal:      General: There is no distension.      Palpations: Abdomen is soft.      Tenderness: There is abdominal tenderness (upon deep papation of the suprapubic area). There is no guarding.   Musculoskeletal:         General: No swelling.   Neurological:      Mental Status: She is alert and oriented to person, place, and time.      Motor: No weakness.         Investigations:     Laboratory Testing:  Recent Results (from the past 24  Antibody Screen NEGATIVE        Imaging/Diagnostics:  CTA ABDOMEN PELVIS W CONTRAST    Result Date: 1/17/2025  EXAMINATION: CTA OF THE ABDOMEN AND PELVIS WITH CONTRAST 1/17/2025 11:02 am: TECHNIQUE: CTA of the abdomen and pelvis was performed with the administration of intravenous contrast. Multiplanar reformatted images are provided for review. MIP images are provided for review. Automated exposure control, iterative reconstruction, and/or weight based adjustment of the mA/kV was utilized to reduce the radiation dose to as low as reasonably achievable. COMPARISON: 04/16/2024, 04/12/2024 HISTORY: ORDERING SYSTEM PROVIDED HISTORY: abd pain, rectal bleeding TECHNOLOGIST PROVIDED HISTORY: abd pain, rectal bleeding Additional Contrast?->1 Reason for Exam: rectal bleeding since last night Additional signs and symptoms: hyst in april 2024 VASCULAR: The abdominal aorta is normal in caliber. No dissection. The visceral branches are patent without evidence for stenosis. The iliac vessels are normal in caliber and well opacified.  The visualized femoral arterial system is well opacified. Nonvascular: The visualized lung bases reveal no acute findings. Findings compatible with hepatic steatosis.  Benign liver lesions are again demonstrated, consistent with cysts.  No inflammatory change identified in the gallbladder fossa. The pancreas, spleen, adrenals and kidneys reveal no acute findings.  Benign left adrenal adenoma again demonstrated. There is no bowel dilatation or wall thickening identified.  Few scattered areas of ingested hyperattenuating foodstuff/pill fragments noted.  No intraluminal blood products or intraluminal contrast.  Diverticulosis. No free air, ascites or lymphadenopathy. No acute findings identified in the pelvis. No acute osseous abnormality or acute soft tissue abnormality identified.     1. No CT evidence for active GI bleed. 2. Diverticulosis. 3. Hepatic steatosis with benign liver cysts again noted.

## 2025-01-17 NOTE — PROGRESS NOTES
Upon screening, pt denies having diarrhea, \"just noted bright red blood in toilet, no stool, when sat on the toilet\"

## 2025-01-17 NOTE — PROGRESS NOTES
Pt arrived to floor from ED to room 2107.  Vitals taken and telemetry applied.  No distress noted. See doc flowsheet for details. Call light within reach, and pt educated on its use. Bed in lowest position, and locked. Side rails up x 2. Denied further questions or needs at this time. No acute distress noted.

## 2025-01-17 NOTE — ED NOTES
Chaperone for Intimate Exam for rectal exam  Chaperone was offered and accepted as part of the rooming process.  Chaperone: Cristine MENCHACA

## 2025-01-17 NOTE — ED PROVIDER NOTES
EMERGENCY DEPARTMENT ENCOUNTER    Pt Name: Betsy Wells  MRN: [id number]  Birthdate 1960  Date of evaluation: 1/17/25  CHIEF COMPLAINT       Chief Complaint   Patient presents with    Rectal Bleeding     HISTORY OF PRESENT ILLNESS   64-year-old female presenting for rectal bleeding.  She never had any episodes like this in the past.  He denies any pain with defecation.  Her symptoms started last night.  She has had several bowel movements that have had bright red blood per rectum.  She is complaining of some lower back pain and she had abdominal pain last night.  Currently she denies any abdominal pain.  Patient said that it makes her feel lightheaded.    The history is provided by the patient.           REVIEW OF SYSTEMS     Review of Systems   Constitutional:  Negative for chills and fever.   Respiratory:  Negative for cough and shortness of breath.    Cardiovascular:  Negative for chest pain.   Gastrointestinal:  Positive for abdominal pain and blood in stool. Negative for nausea and vomiting.   Musculoskeletal:  Positive for back pain. Negative for myalgias.   Neurological:  Positive for light-headedness. Negative for headaches.     PASTMEDICAL HISTORY     Past Medical History:   Diagnosis Date    Abnormal Pap smear of cervix     Long long ago    Abnormal uterine bleeding (AUB)     Endocrine cancer (HCC)     Hyperlipidemia     Hypertension     Squamous cell cancer of skin of upper arm     follows with derm in [place]    Under care of service provider 04/15/2024    pcp-leonela alba [facility] [phone]-last visit feb 2024    Under care of service provider 04/15/2024    [phone]-last visit march 2024    Under care of service provider 04/15/2024    oncology ob/gyn--jose/jvpdct-[phone]-last visit march 2024     Past Problem List  Patient Active Problem List   Diagnosis Code    Class 2 obesity due to excess calories without serious comorbidity  with body mass index (BMI) of 37.0 to 37.9 in adult E66.812, E66.09, Z68.37    D&C/ Hysteroscopy with myosure resection abnormal tissue 3/22/2024 Z98.890    Endometrial cancer (HCC) C54.1    s/p RALH, BSO, Cysto, lymph nodes 4/22/24 Z90.710    Rectal bleeding K62.5     SURGICAL HISTORY       Past Surgical History:   Procedure Laterality Date    BREAST BIOPSY Right     has a marker    COLONOSCOPY      DILATION AND CURETTAGE OF UTERUS N/A 03/22/2024    DILATATION AND CURETTAGE HYSTEROSCOPY W/MYOSURE performed by Rocío Nelson DO at [facility] OR    HYSTERECTOMY (CERVIX STATUS UNKNOWN) N/A 4/22/2024    XI ROBOTIC LAPAROSCOPIC HYSTERECTOMY, BSO, ICG CERVIX DYE INJECTION, LYMPHNODE REMOVAL, CYSTOSCOPY *SHORT STAY* performed by Daniel Lawler MD at [facility] OR    LAPAROSCOPIC HYSTERECTOMY  04/22/2024    XI ROBOTIC LAPAROSCOPIC HYSTERECTOMY, BSO, ICG CERVIX DYE INJECTION, LYMPHNODE REMOVAL, CYSTOSCOPY    SKIN BIOPSY      WISDOM TOOTH EXTRACTION       CURRENT MEDICATIONS       Current Discharge Medication List        CONTINUE these medications which have NOT CHANGED    Details   latanoprost (XALATAN) 0.005 % ophthalmic solution 1 drop nightly      ibuprofen (ADVIL;MOTRIN) 200 MG tablet Take 1 tablet by mouth every 6 hours as needed for Pain      aspirin-acetaminophen-caffeine (EXCEDRIN MIGRAINE) 250-250-65 MG per tablet Take 1 tablet by mouth every 6 hours as needed for Headaches or Pain      lisinopril (PRINIVIL;ZESTRIL) 20 MG tablet Take 1 tablet by mouth daily  Qty: 90 tablet, Refills: 3    Associated Diagnoses: Hypertension goal BP (blood pressure) < 130/80      Red Yeast Rice 600 MG TABS Take 600 mg by mouth in the morning and at bedtime    Associated Diagnoses: Hyperlipidemia, unspecified hyperlipidemia type      calcium carbonate 600 MG TABS tablet Take 1 tablet by mouth daily    Associated Diagnoses: Endometrial cancer (HCC)      Omega-3 Fatty Acids (FISH OIL) 1200 MG CAPS Take 1,200 mg by mouth once     left CVA tenderness, guarding or rebound.   Musculoskeletal:         General: Normal range of motion.   Skin:     General: Skin is warm.   Neurological:      Mental Status: She is alert.         MEDICAL DECISION MAKING / ED COURSE:         1)  Number and Complexity of Problems Addressed at this Encounter  Problem List This Visit: Rectal bleeding, abdominal pain    Differential Diagnosis: Hemorrhoid, GI bleed, diverticulitis    64-year-old female presenting for rectal bleeding.  She never had any episodes like this in the past.  He denies any pain with defecation.  Her symptoms started last night.  She has had several bowel movements that have had bright red blood per rectum.  She is complaining of some lower back pain and she had abdominal pain last night.  Currently she denies any abdominal pain.  Patient said that it makes her feel lightheaded.    2)  Data Reviewed and Analyzed  (Lab and radiology tests/orders below in next section)    Patient's hemoglobin is 12.1.  She has skylar blood per rectum even without defecation.  Sickle exam did not reveal any evidence of hemorrhoids.    Imaging that is independently reviewed and interpreted by me as: CTA abd/pelvis was obtained which showed no acute pathology.      3)  Treatment and Disposition             I spoke to Tiff Rondon for GI and informed her of the case.  Consults been placed to Dr. Ramirez.  I spoke to Dr. Rosario who accepts admission.  Patient is agreeable with the above plan.    CRITICAL CARE:       PROCEDURES:    Procedures      DATA FOR LAB AND RADIOLOGY TESTS ORDERED BELOW ARE REVIEWED BY THE ED CLINICIAN:    RADIOLOGY: All x-rays, CT, MRI, and formal ultrasound images (except ED bedside ultrasound) are read by the radiologist, see reports below, unless otherwise noted in MDM or here.  Reports below are reviewed by myself.  CTA ABDOMEN PELVIS W CONTRAST   Final Result   1. No CT evidence for active GI bleed.   2. Diverticulosis.   3. Hepatic steatosis

## 2025-01-17 NOTE — CARE COORDINATION
Case Management Assessment  Initial Evaluation    Date/Time of Evaluation: 1/17/2025 3:40 PM  Assessment Completed by: Martha Ibrahim RN    If patient is discharged prior to next notation, then this note serves as note for discharge by case management.    Patient Name: Betsy Wells                   YOB: 1960  Diagnosis: Rectal bleeding [K62.5]                   Date / Time: 1/17/2025  9:39 AM    Patient Admission Status: Inpatient   Readmission Risk (Low < 19, Mod (19-27), High > 27): No data recorded  Current PCP: Anca Rangel APRN - CNP  PCP verified by CM? Yes    Chart Reviewed: Yes      History Provided by: Patient  Patient Orientation: Alert and Oriented    Patient Cognition: Alert    Hospitalization in the last 30 days (Readmission):  No    If yes, Readmission Assessment in  Navigator will be completed.    Advance Directives:      Code Status: Full Code   Patient's Primary Decision Maker is: Legal Next of Kin    Primary Decision Maker: William Wells - Spouse - [phone]    Discharge Planning:    Patient lives with: Spouse/Significant Other Type of Home: House  Primary Care Giver: Self  Patient Support Systems include: Spouse/Significant Other, Children   Current Financial resources: Medicare  Current community resources: None  Current services prior to admission: None            Current DME:              Type of Home Care services:  None    ADLS  Prior functional level: Independent in ADLs/IADLs  Current functional level: Independent in ADLs/IADLs    PT AM-PAC:   /24  OT AM-PAC:   /24    Family can provide assistance at DC: Yes  Would you like Case Management to discuss the discharge plan with any other family members/significant others, and if so, who? No  Plans to Return to Present Housing: Yes  Other Identified Issues/Barriers to RETURNING to current housing: None  Potential Assistance needed at discharge: N/A            Potential DME:    Patient expects to discharge to:

## 2025-01-17 NOTE — TELEPHONE ENCOUNTER
Advised patient to go to the [facility] walk-in clinic or ER for rectal bleeding. No available appts  Patient voiced understanding.

## 2025-01-18 LAB
ANION GAP SERPL CALCULATED.3IONS-SCNC: 8 MMOL/L (ref 9–16)
BASOPHILS # BLD: 0 K/UL (ref 0–0.2)
BASOPHILS NFR BLD: 1 % (ref 0–2)
BUN SERPL-MCNC: 23 MG/DL (ref 8–23)
CALCIUM SERPL-MCNC: 8.5 MG/DL (ref 8.6–10.4)
CHLORIDE SERPL-SCNC: 110 MMOL/L (ref 98–107)
CO2 SERPL-SCNC: 24 MMOL/L (ref 20–31)
CREAT SERPL-MCNC: 0.7 MG/DL (ref 0.7–1.2)
EOSINOPHIL # BLD: 0.2 K/UL (ref 0–0.4)
EOSINOPHILS RELATIVE PERCENT: 3 % (ref 0–4)
ERYTHROCYTE [DISTWIDTH] IN BLOOD BY AUTOMATED COUNT: 13.6 % (ref 11.5–14.9)
GFR, ESTIMATED: >90 ML/MIN/1.73M2
GLUCOSE SERPL-MCNC: 99 MG/DL (ref 74–99)
HBV CORE AB SER QL: NONREACTIVE
HBV SURFACE AB SERPL IA-ACNC: <3.5 MIU/ML
HBV SURFACE AG SERPL QL IA: NONREACTIVE
HCT VFR BLD AUTO: 30.9 % (ref 36–46)
HCT VFR BLD AUTO: 31.5 % (ref 36–46)
HCT VFR BLD AUTO: 31.5 % (ref 36–46)
HCV AB SERPL QL IA: NONREACTIVE
HGB BLD-MCNC: 10.5 G/DL (ref 12–16)
HGB BLD-MCNC: 10.6 G/DL (ref 12–16)
HGB BLD-MCNC: 10.7 G/DL (ref 12–16)
LYMPHOCYTES NFR BLD: 2.1 K/UL (ref 1–4.8)
LYMPHOCYTES RELATIVE PERCENT: 35 % (ref 24–44)
MCH RBC QN AUTO: 29 PG (ref 26–34)
MCHC RBC AUTO-ENTMCNC: 33.9 G/DL (ref 31–37)
MCV RBC AUTO: 85.6 FL (ref 80–100)
MONOCYTES NFR BLD: 0.5 K/UL (ref 0.1–1.3)
MONOCYTES NFR BLD: 8 % (ref 1–7)
NEUTROPHILS NFR BLD: 53 % (ref 36–66)
NEUTS SEG NFR BLD: 3.3 K/UL (ref 1.3–9.1)
PLATELET # BLD AUTO: 239 K/UL (ref 150–450)
PMV BLD AUTO: 7.8 FL (ref 6–12)
POTASSIUM SERPL-SCNC: 3.7 MMOL/L (ref 3.7–5.3)
RBC # BLD AUTO: 3.61 M/UL (ref 4–5.2)
SODIUM SERPL-SCNC: 142 MMOL/L (ref 136–145)
WBC OTHER # BLD: 6.1 K/UL (ref 3.5–11)

## 2025-01-18 PROCEDURE — 99232 SBSQ HOSP IP/OBS MODERATE 35: CPT | Performed by: INTERNAL MEDICINE

## 2025-01-18 PROCEDURE — 85018 HEMOGLOBIN: CPT

## 2025-01-18 PROCEDURE — 36415 COLL VENOUS BLD VENIPUNCTURE: CPT

## 2025-01-18 PROCEDURE — 2060000000 HC ICU INTERMEDIATE R&B

## 2025-01-18 PROCEDURE — 2580000003 HC RX 258

## 2025-01-18 PROCEDURE — 6370000000 HC RX 637 (ALT 250 FOR IP)

## 2025-01-18 PROCEDURE — 80048 BASIC METABOLIC PNL TOTAL CA: CPT

## 2025-01-18 PROCEDURE — 99233 SBSQ HOSP IP/OBS HIGH 50: CPT | Performed by: INTERNAL MEDICINE

## 2025-01-18 PROCEDURE — 2500000003 HC RX 250 WO HCPCS

## 2025-01-18 PROCEDURE — 6370000000 HC RX 637 (ALT 250 FOR IP): Performed by: NURSE PRACTITIONER

## 2025-01-18 PROCEDURE — 85014 HEMATOCRIT: CPT

## 2025-01-18 PROCEDURE — 6360000002 HC RX W HCPCS

## 2025-01-18 PROCEDURE — 85025 COMPLETE CBC W/AUTO DIFF WBC: CPT

## 2025-01-18 PROCEDURE — APPNB60 APP NON BILLABLE TIME 46-60 MINS: Performed by: NURSE PRACTITIONER

## 2025-01-18 RX ORDER — POLYETHYLENE GLYCOL 3350 17 G/17G
238 POWDER, FOR SOLUTION ORAL ONCE
Status: COMPLETED | OUTPATIENT
Start: 2025-01-18 | End: 2025-01-18

## 2025-01-18 RX ORDER — BISACODYL 5 MG/1
20 TABLET, DELAYED RELEASE ORAL ONCE
Status: COMPLETED | OUTPATIENT
Start: 2025-01-18 | End: 2025-01-18

## 2025-01-18 RX ADMIN — SODIUM CHLORIDE, PRESERVATIVE FREE 10 ML: 5 INJECTION INTRAVENOUS at 20:17

## 2025-01-18 RX ADMIN — POLYETHYLENE GLYCOL 3350 238 G: 17 POWDER, FOR SOLUTION ORAL at 16:37

## 2025-01-18 RX ADMIN — ACETAMINOPHEN 650 MG: 325 TABLET ORAL at 08:17

## 2025-01-18 RX ADMIN — BISACODYL 20 MG: 5 TABLET, COATED ORAL at 12:29

## 2025-01-18 RX ADMIN — SODIUM CHLORIDE, PRESERVATIVE FREE 40 MG: 5 INJECTION INTRAVENOUS at 07:50

## 2025-01-18 RX ADMIN — ACETAMINOPHEN 650 MG: 325 TABLET ORAL at 17:26

## 2025-01-18 ASSESSMENT — PAIN DESCRIPTION - ONSET
ONSET: PROGRESSIVE
ONSET: ON-GOING

## 2025-01-18 ASSESSMENT — PAIN DESCRIPTION - ORIENTATION
ORIENTATION: LOWER;RIGHT
ORIENTATION: UPPER

## 2025-01-18 ASSESSMENT — PAIN DESCRIPTION - DESCRIPTORS
DESCRIPTORS: SHARP
DESCRIPTORS: DULL

## 2025-01-18 ASSESSMENT — PAIN - FUNCTIONAL ASSESSMENT
PAIN_FUNCTIONAL_ASSESSMENT: ACTIVITIES ARE NOT PREVENTED
PAIN_FUNCTIONAL_ASSESSMENT: ACTIVITIES ARE NOT PREVENTED

## 2025-01-18 ASSESSMENT — PAIN SCALES - GENERAL
PAINLEVEL_OUTOF10: 3
PAINLEVEL_OUTOF10: 1
PAINLEVEL_OUTOF10: 3
PAINLEVEL_OUTOF10: 1
PAINLEVEL_OUTOF10: 0

## 2025-01-18 ASSESSMENT — PAIN DESCRIPTION - PAIN TYPE
TYPE: ACUTE PAIN
TYPE: ACUTE PAIN

## 2025-01-18 ASSESSMENT — PAIN DESCRIPTION - LOCATION
LOCATION: HEAD
LOCATION: BACK

## 2025-01-18 ASSESSMENT — PAIN DESCRIPTION - FREQUENCY
FREQUENCY: INTERMITTENT
FREQUENCY: INTERMITTENT

## 2025-01-18 NOTE — PLAN OF CARE
Problem: Discharge Planning  Goal: Discharge to home or other facility with appropriate resources  1/18/2025 0331 by Maral Kaur, RN  Outcome: Progressing  Flowsheets (Taken 1/17/2025 2000)  Discharge to home or other facility with appropriate resources:   Identify barriers to discharge with patient and caregiver   Arrange for needed discharge resources and transportation as appropriate   Identify discharge learning needs (meds, wound care, etc)  1/17/2025 1719 by Nguyen Ocasio, RN  Outcome: Progressing     Problem: Safety - Adult  Goal: Free from fall injury  1/18/2025 0331 by Maral Kaur, RN  Outcome: Progressing  Flowsheets (Taken 1/18/2025 0331)  Free From Fall Injury: Instruct family/caregiver on patient safety  1/17/2025 1719 by Nguyen Ocasio, RN  Outcome: Progressing     Problem: Gastrointestinal - Adult  Goal: Maintains or returns to baseline bowel function  Outcome: Progressing  Flowsheets (Taken 1/17/2025 2000)  Maintains or returns to baseline bowel function:   Assess bowel function   Administer IV fluids as ordered to ensure adequate hydration

## 2025-01-18 NOTE — CARE COORDINATION
ONGOING DISCHARGE PLAN:    Patient is alert and oriented x4.    Spoke with patient regarding discharge plan and patient confirms that plan is still to return to home w/ .     Denies VNS.     HGB today 10.6. GI on Board. Will have Colonoscopy kel.     Will continue to follow for additional discharge needs.    If patient is discharged prior to next notation, then this note serves as note for discharge by case management.    Electronically signed by Martha Ibrahim RN on 1/18/2025 at 2:10 PM

## 2025-01-18 NOTE — PROGRESS NOTES
GI Progress notes    1/18/2025   10:30 AM    Name:  Betsy Wells  MRN:    [id number]     Acct:     [id number]   Room:  [address] [place] [address] Day: 1     Admit Date: 1/17/2025  9:39 AM  PCP: Anca Rangel APRN - CNP    Subjective:     C/C:   Chief Complaint   Patient presents with    Rectal Bleeding       Interval History: Status: improved.     Patient seen and examined.  No acute events overnight.  No bleeding overnight  Hgb 10.5    ROS:  Constitutional: negative for chills, fevers and sweats  Respiratory: negative for cough, dyspnea on exertion, hemoptysis, shortness of breath and wheezing  Cardiovascular: negative for chest pain, chest pressure/discomfort, dyspnea, lower extremity edema and palpitations  Gastrointestinal: negative for abdominal pain, constipation, diarrhea, nausea and vomiting  Neurological: negative for dizziness and headaches    Medications:     Allergies: No Known Allergies    Current Meds: polyethylene glycol (GLYCOLAX) packet 238 g, Once  bisacodyl (DULCOLAX) EC tablet 20 mg, Once  sodium chloride flush 0.9 % injection 10 mL, PRN  [Held by provider] lisinopril (PRINIVIL;ZESTRIL) tablet 20 mg, Daily  sodium chloride flush 0.9 % injection 5-40 mL, 2 times per day  sodium chloride flush 0.9 % injection 5-40 mL, PRN  0.9 % sodium chloride infusion, PRN  potassium chloride (KLOR-CON M) extended release tablet 40 mEq, PRN   Or  potassium bicarb-citric acid (EFFER-K) effervescent tablet 40 mEq, PRN   Or  potassium chloride 10 mEq/100 mL IVPB (Peripheral Line), PRN  magnesium sulfate 2000 mg in water 50 mL IVPB, PRN  ondansetron (ZOFRAN-ODT) disintegrating tablet 4 mg, Q8H PRN   Or  ondansetron (ZOFRAN) injection 4 mg, Q6H PRN  polyethylene glycol (GLYCOLAX) packet 17 g, Daily PRN  acetaminophen (TYLENOL) tablet 650 mg, Q6H PRN   Or  acetaminophen (TYLENOL) suppository 650 mg, Q6H PRN  0.9 % sodium chloride infusion, Continuous  pantoprazole (PROTONIX) 40 mg in sodium chloride (PF) 0.9 % 10 mL  intraluminal blood products or intraluminal contrast.  Diverticulosis. No free air, ascites or lymphadenopathy. No acute findings identified in the pelvis. No acute osseous abnormality or acute soft tissue abnormality identified.     1. No CT evidence for active GI bleed. 2. Diverticulosis. 3. Hepatic steatosis with benign liver cysts again noted. 4. Benign left adrenal adenoma again demonstrated.        Physical Examination:        General appearance: alert, cooperative and no distress  Mental Status: oriented to person, place and time and normal affect  Lungs: clear to auscultation bilaterally, normal effort  Heart: regular rate and rhythm, no murmur,  Abdomen: soft, nontender, nondistended, bowel sounds present all four quadrants, no masses, hepatomegaly or splenomegaly  Extremities: no edema, redness or tenderness in the calves  Skin: no gross lesions, rashes, or induration    Assessment:        Primary Problem  Rectal bleeding     Active Hospital Problems    Diagnosis Date Noted    Rectal bleeding [K62.5] 01/17/2025     Past Medical History:   Diagnosis Date    Abnormal Pap smear of cervix     Long long ago    Abnormal uterine bleeding (AUB)     Endocrine cancer (HCC)     Hyperlipidemia     Hypertension     Squamous cell cancer of skin of upper arm     follows with derm in [place]    Under care of service provider 04/15/2024    [phone]-last visit feb 2024    Under care of service provider 04/15/2024    [phone]-last visit march 2024    Under care of service provider 04/15/2024    oncology ob/gyn--jose/nzkpwp-[phone]-last visit march 2024        Plan:        Anemia, BRBPR  -Colonoscopy tomorrow.  Likely diverticular.  R/o malignancy, hemorrhoids etc.  -CLD today  -Prep as ordered  -NPO after midnight  -Trend HH  -Transfuse for hgb < 7    2. HTN    This plan was formulated in collaboration with Dr. Tonie Rivero  to the patient and d/W Nursing Staff  Will F/U with you  Please call or Page for any issues or change in status  Thanks    This note is created with the assistance of the speech recognition program.  While intending to generate a document that actually reflects the content of the visit, document can still have some errors including those of syntax and sound like substitutions which may escape proof reading.  Actual meaning can be extrapolated by contextual diversion.    Electronically signed by COY Melvin NP on 1/18/2025 at 10:30 AM       Attested:    I have discussed the care of Betsy Wells and I have examined the patient myselft and taken ros and hpi , including pertinent history and exam findings,  with the author of this note . I have reviewed the key elements of all parts of the encounter with the nurse practitioner/resident.  I agree with the assessment, plan and orders as documented by the above health care provider with the following addendum    Impression- Hematochezia      Plan- Monitor H&H q daily and transfuse if Hb < 7 g/dl  Will plan colonoscopy    More than 50% of the time was spent taking care of this patient in addition to the nurse practitioner time.  That also included history taking follow-up physical examination and review of system.    Electronically signed by Pretty Schilling MD

## 2025-01-18 NOTE — PLAN OF CARE
Problem: Discharge Planning  Goal: Discharge to home or other facility with appropriate resources  1/18/2025 1506 by Mandy Farias, RN  Outcome: Progressing     Problem: Safety - Adult  Goal: Free from fall injury  1/18/2025 1506 by Mandy Farias, RN  Outcome: Progressing     Problem: Gastrointestinal - Adult  Goal: Maintains or returns to baseline bowel function  1/18/2025 1506 by Mandy Farias, RN  Outcome: Progressing

## 2025-01-18 NOTE — PROGRESS NOTES
[facility]  IN-PATIENT SERVICE  [facility]    PROGRESS NOTE             1/18/2025    7:37 AM    Name:   Betsy Wells  MRN:     [id number]     Acct:      [id number]   Room:   2107/2107-01   Day:  1  Admit Date:  1/17/2025  9:39 AM    PCP:  Anca Rangel APRN - CNP  Code Status:  Full Code    Subjective:     C/C:   Chief Complaint   Patient presents with    Rectal Bleeding     Interval History:     -Vitals stable.  -Hb decreasing: 10.5 today, hematocrit 30.9. BUN:Cr ratio improved: 23:0.7.  -Evaluated by GI: watchful waiting; if signs of continuous bleeding, colonoscopy.  -Clinically, has no complaints.    Brief History:     The patient is a 64 y.o. female who presented 01/17/2025 with painless bright red bleeding per rectum for which she was admitted.     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     The patient was in her usual state of health when 01/16/2025, in the evening, the patient had 5 bloody stools and noticed small back pain that was new for her; she was also lightheaded. She denies any such episodes before.     In the ER, hemoglobin was 12.1, cr 0.8, BUN 32. CTA abdomen and pelvis with contrast showed no evidence for active GI bleed.  She was given IV fluids and admitted with the plan for possible endoscopy next day.    Medications:     Allergies:  No Known Allergies    Current Meds:   Scheduled Meds:    [Held by provider] lisinopril  20 mg Oral Daily    sodium chloride flush  5-40 mL IntraVENous 2 times per day    pantoprazole (PROTONIX) 40 mg in sodium chloride (PF) 0.9 % 10 mL injection  40 mg IntraVENous Daily     Continuous Infusions:    sodium chloride      sodium chloride 75 mL/hr at 01/17/25 1438     PRN Meds: sodium chloride flush, sodium chloride flush, sodium chloride, potassium chloride **OR** potassium

## 2025-01-18 NOTE — CONSULTS
Gastroenterology Consult Note      Patient: Betsy Wells  : 1960  Acct#:  [id number]     Date:  2025    Subjective:       History of Present Illness  Patient is a 64 y.o.  female admitted with Rectal bleeding [K62.5] who is seen in consult for rectal bleeding    This is a 64-year-old lady  Yesterday at 7:30 PM she was in a restaurant eating dinner  And she felt that she has to go to the bathroom and had an explosive bowel movement which has blood  And then that happened twice last night and twice during the night  But the last episode was at 730 this morning and since then she had no blood at all all day  She denied cramps or pain  She denied any black stool  She denied any nausea or vomiting or any upper GI symptoms  She never had any GERD or any dysphagia  She does take some NSAIDs but very infrequently she took Ecotrin  Twice last week  She denied any fever or chills  Her hemoglobin was normal up to this morning and this afternoon trended down to 10.6  She does have chronic elevation of her liver enzyme and she had an MRI around 8 9 months ago which showed steatosis and showed gallbladder stones  And the CAT scan she had here with the CTA also showing steatosis  She denied drinking alcohol  She denied NSAIDs taken as mentioned  And currently she has no abdominal pain at all in the right upper quadrant area    She had a colonoscopy more than 15 years ago and she has been screened with Cologuard which been negative according to her she has been doing those routinely  Her platelet count is normal  INR has been normal  Bilirubin has been normal  And as mentioned ALT and AST fluctuate chronically  Viral hepatitis been checked before and is been negative              Past Medical History:   Diagnosis Date    Abnormal Pap smear of cervix     Long long ago    Abnormal uterine bleeding (AUB)     Endocrine cancer (HCC)     Hyperlipidemia     Hypertension     Squamous cell cancer of skin  regular rhythm, normal S1 and S2, no murmurs, rubs, clicks or gallops, distal pulses intact, no carotid bruits  Abdomen: soft, non-tender, non-distended, normal bowel sounds, no masses or organomegaly  Extremities: no cyanosis, clubbing or edema  Musculoskeletal: normal range of motion, no joint swelling, deformity or tenderness  Neurologic: no cranial nerve deficit and muscle strength normal    Data Review:    Recent Labs     01/17/25  1009   WBC 7.9   HGB 12.1   HCT 35.1*   MCV 85.3        Recent Labs     01/17/25  1009      K 4.1      CO2 24   BUN 32*   CREATININE 0.8     Recent Labs     01/17/25  1009   AST 25   ALT 45*   BILITOT 0.3   ALKPHOS 87     No results for input(s): \"LIPASE\", \"AMYLASE\" in the last 72 hours.  Recent Labs     01/17/25  1009   PROTIME 14.3   INR 1.0     Invalid input(s): \"PTT\"  No results for input(s): \"OCCULTBLD\" in the last 72 hours.  CEA:  No results found for: \"CEA\"  Ca 125:  No results found for: \"\"  Ca 19-9:  No results found for: \"\"  Ca 15-3:  No results found for: \"\"  AFP:  No components found for: \"AFAFP\"  Beta HCG:  No components found for: \"BHCG\"  Neuron Specific Enolase:  No results found for: \"NSE\"  Imaging Studies:                           All appropriate imaging studies and reports reviewed: Yes                 Assessment:     Principal Problem:    Rectal bleeding  Resolved Problems:    * No resolved hospital problems. *    Rectal bleeding  CTA showing diverticulosis no colitis  Elevated liver enzymes chronically with liver steatosis  Gallbladder stones but no abdominal pain at all      Bleeding subsided since this morning  Hemoglobin trended down      Recommendations:   Will watch overnight if she has any bloody bowel movement then we will prep her and proceed with colonoscopy on the weekend  Otherwise colonoscopy could be done on Monday or as an outpatient    Will study the liver but seems to be more of fatty liver and steatosis which

## 2025-01-19 ENCOUNTER — ANESTHESIA (OUTPATIENT)
Dept: ENDOSCOPY | Age: 65
End: 2025-01-19
Payer: MEDICARE

## 2025-01-19 ENCOUNTER — ANESTHESIA EVENT (OUTPATIENT)
Dept: ENDOSCOPY | Age: 65
End: 2025-01-19
Payer: MEDICARE

## 2025-01-19 VITALS
WEIGHT: 230 LBS | DIASTOLIC BLOOD PRESSURE: 62 MMHG | TEMPERATURE: 97.3 F | SYSTOLIC BLOOD PRESSURE: 112 MMHG | HEART RATE: 82 BPM | BODY MASS INDEX: 36.96 KG/M2 | RESPIRATION RATE: 12 BRPM | HEIGHT: 66 IN | OXYGEN SATURATION: 99 %

## 2025-01-19 LAB
ANION GAP SERPL CALCULATED.3IONS-SCNC: 8 MMOL/L (ref 9–16)
BASOPHILS # BLD: 0 K/UL (ref 0–0.2)
BASOPHILS NFR BLD: 1 % (ref 0–2)
BUN SERPL-MCNC: 13 MG/DL (ref 8–23)
CALCIUM SERPL-MCNC: 8.7 MG/DL (ref 8.6–10.4)
CHLORIDE SERPL-SCNC: 110 MMOL/L (ref 98–107)
CO2 SERPL-SCNC: 26 MMOL/L (ref 20–31)
CREAT SERPL-MCNC: 0.8 MG/DL (ref 0.7–1.2)
EOSINOPHIL # BLD: 0.2 K/UL (ref 0–0.4)
EOSINOPHILS RELATIVE PERCENT: 3 % (ref 0–4)
ERYTHROCYTE [DISTWIDTH] IN BLOOD BY AUTOMATED COUNT: 13.4 % (ref 11.5–14.9)
GFR, ESTIMATED: 82 ML/MIN/1.73M2
GLUCOSE SERPL-MCNC: 107 MG/DL (ref 74–99)
HCT VFR BLD AUTO: 31.3 % (ref 36–46)
HCT VFR BLD AUTO: 31.9 % (ref 36–46)
HGB BLD-MCNC: 10.6 G/DL (ref 12–16)
HGB BLD-MCNC: 10.9 G/DL (ref 12–16)
LYMPHOCYTES NFR BLD: 2.1 K/UL (ref 1–4.8)
LYMPHOCYTES RELATIVE PERCENT: 32 % (ref 24–44)
MCH RBC QN AUTO: 29.3 PG (ref 26–34)
MCHC RBC AUTO-ENTMCNC: 33.9 G/DL (ref 31–37)
MCV RBC AUTO: 86.5 FL (ref 80–100)
MONOCYTES NFR BLD: 0.5 K/UL (ref 0.1–1.3)
MONOCYTES NFR BLD: 8 % (ref 1–7)
NEUTROPHILS NFR BLD: 56 % (ref 36–66)
NEUTS SEG NFR BLD: 3.7 K/UL (ref 1.3–9.1)
PLATELET # BLD AUTO: 252 K/UL (ref 150–450)
PMV BLD AUTO: 7 FL (ref 6–12)
POTASSIUM SERPL-SCNC: 4 MMOL/L (ref 3.7–5.3)
RBC # BLD AUTO: 3.61 M/UL (ref 4–5.2)
SMOOTH MUSCLE ANTIBODY: 16 UNITS (ref 0–19)
SODIUM SERPL-SCNC: 144 MMOL/L (ref 136–145)
WBC OTHER # BLD: 6.5 K/UL (ref 3.5–11)

## 2025-01-19 PROCEDURE — 85018 HEMOGLOBIN: CPT

## 2025-01-19 PROCEDURE — 2580000003 HC RX 258: Performed by: INTERNAL MEDICINE

## 2025-01-19 PROCEDURE — 2580000003 HC RX 258

## 2025-01-19 PROCEDURE — 99239 HOSP IP/OBS DSCHRG MGMT >30: CPT | Performed by: INTERNAL MEDICINE

## 2025-01-19 PROCEDURE — 7100000000 HC PACU RECOVERY - FIRST 15 MIN: Performed by: INTERNAL MEDICINE

## 2025-01-19 PROCEDURE — 2709999900 HC NON-CHARGEABLE SUPPLY: Performed by: INTERNAL MEDICINE

## 2025-01-19 PROCEDURE — 2500000003 HC RX 250 WO HCPCS

## 2025-01-19 PROCEDURE — 45380 COLONOSCOPY AND BIOPSY: CPT | Performed by: INTERNAL MEDICINE

## 2025-01-19 PROCEDURE — 7100000001 HC PACU RECOVERY - ADDTL 15 MIN: Performed by: INTERNAL MEDICINE

## 2025-01-19 PROCEDURE — 88342 IMHCHEM/IMCYTCHM 1ST ANTB: CPT

## 2025-01-19 PROCEDURE — 85014 HEMATOCRIT: CPT

## 2025-01-19 PROCEDURE — 6360000002 HC RX W HCPCS

## 2025-01-19 PROCEDURE — 36415 COLL VENOUS BLD VENIPUNCTURE: CPT

## 2025-01-19 PROCEDURE — 88312 SPECIAL STAINS GROUP 1: CPT

## 2025-01-19 PROCEDURE — 0DBB8ZX EXCISION OF ILEUM, VIA NATURAL OR ARTIFICIAL OPENING ENDOSCOPIC, DIAGNOSTIC: ICD-10-PCS | Performed by: INTERNAL MEDICINE

## 2025-01-19 PROCEDURE — 80048 BASIC METABOLIC PNL TOTAL CA: CPT

## 2025-01-19 PROCEDURE — 6360000002 HC RX W HCPCS: Performed by: ANESTHESIOLOGY

## 2025-01-19 PROCEDURE — 3700000000 HC ANESTHESIA ATTENDED CARE: Performed by: INTERNAL MEDICINE

## 2025-01-19 PROCEDURE — 88305 TISSUE EXAM BY PATHOLOGIST: CPT

## 2025-01-19 PROCEDURE — 3700000001 HC ADD 15 MINUTES (ANESTHESIA): Performed by: INTERNAL MEDICINE

## 2025-01-19 PROCEDURE — 3609010300 HC COLONOSCOPY W/BIOPSY SINGLE/MULTIPLE: Performed by: INTERNAL MEDICINE

## 2025-01-19 PROCEDURE — 85025 COMPLETE CBC W/AUTO DIFF WBC: CPT

## 2025-01-19 RX ORDER — PROPOFOL 10 MG/ML
INJECTION, EMULSION INTRAVENOUS
Status: DISCONTINUED | OUTPATIENT
Start: 2025-01-19 | End: 2025-01-19 | Stop reason: SDUPTHER

## 2025-01-19 RX ORDER — MIDAZOLAM HYDROCHLORIDE 1 MG/ML
INJECTION, SOLUTION INTRAMUSCULAR; INTRAVENOUS
Status: DISCONTINUED | OUTPATIENT
Start: 2025-01-19 | End: 2025-01-19 | Stop reason: SDUPTHER

## 2025-01-19 RX ADMIN — SODIUM CHLORIDE, PRESERVATIVE FREE 10 ML: 5 INJECTION INTRAVENOUS at 07:49

## 2025-01-19 RX ADMIN — PROPOFOL 100 MG: 10 INJECTION, EMULSION INTRAVENOUS at 10:35

## 2025-01-19 RX ADMIN — PROPOFOL 50 MG: 10 INJECTION, EMULSION INTRAVENOUS at 10:39

## 2025-01-19 RX ADMIN — SODIUM CHLORIDE, PRESERVATIVE FREE 40 MG: 5 INJECTION INTRAVENOUS at 07:47

## 2025-01-19 RX ADMIN — PROPOFOL 50 MCG/KG/MIN: 10 INJECTION, EMULSION INTRAVENOUS at 10:41

## 2025-01-19 RX ADMIN — PROPOFOL 50 MG: 10 INJECTION, EMULSION INTRAVENOUS at 10:43

## 2025-01-19 RX ADMIN — SODIUM CHLORIDE: 9 INJECTION, SOLUTION INTRAVENOUS at 10:33

## 2025-01-19 RX ADMIN — MIDAZOLAM 2 MG: 1 INJECTION INTRAMUSCULAR; INTRAVENOUS at 10:43

## 2025-01-19 ASSESSMENT — PAIN - FUNCTIONAL ASSESSMENT: PAIN_FUNCTIONAL_ASSESSMENT: NONE - DENIES PAIN

## 2025-01-19 NOTE — OP NOTE
PROCEDURE NOTE    DATE OF PROCEDURE: 1/19/2025    SURGEON: Pretty Schilling MD  Facility : [facility]  ASSISTANT: None  Anesthesia: Monitored anesthesia care  PREOPERATIVE DIAGNOSIS: Hematochezia    POSTOPERATIVE DIAGNOSIS: as described below    OPERATION: Total colonoscopy     ANESTHESIA: Moderate Sedation    ESTIMATED BLOOD LOSS: less than 50     COMPLICATIONS: None.     SPECIMENS:  Was Obtained: terminal ileum biopsy    HISTORY: The patient is a 64 y.o. year old female with history of above preop diagnosis.  I recommended colonoscopy with possible biopsy or polypectomy and I explained the risk, benefits, expected outcome, and alternatives to the procedure.  Risks included but are not limited to bleeding, infection, respiratory distress, hypotension, and perforation of the colon and possibility of missing a lesion.  The patient understands and is in agreement.        PROCEDURE: The patient was given IV conscious sedation.  The patient's SPO2 remained above 90% throughout the procedure.     Digital rectal exam- normal    The colonoscope was inserted per rectum and advanced under direct vision to the cecum without difficulty.      Post sedation note :The patient's SPO2 remained above 90% throughout the procedure.the vital signs remained stable , and no immediate complication form the procedure noted, patient will be ready for d/c when criteria is met .        The prep was good.      Findings:  Terminal ileum: abnormal: few erosions seen, biopsy obtained    Cecum: normal    Ascending colon: normal    Transverse colon: normal    Descending/Sigmoid colon: normal    Rectum/Anus: examined in normal and retroflexed positions and was normal; hemorrhoids moderate sized    Withdrawal Time was (minutes): 9    Diverticulosis: none    The colon was decompressed and the scope was removed.  The patient tolerated the procedure well.     Impression: Few erosions in terminal ileum  Internal hemorrhoids, likely cause of  hematochezia    Recommendations/Plan:   Lifestyle and dietary modifications as discussed  F/U Biopsies  F/U In Office Yes  Repeat colonoscopy in 10 years    Electronically signed by Pretty Schilling MD  on 1/19/2025 at 10:55 AM

## 2025-01-19 NOTE — PROGRESS NOTES
[facility]  IN-PATIENT SERVICE  [facility]    PROGRESS NOTE             1/19/2025    7:08 AM    Name:   Betsy Wells  MRN:     [id number]     Acct:      [id number]   Room:   2107/2107-01   Day:  2  Admit Date:  1/17/2025  9:39 AM    PCP:  Anca Rangel APRN - CNP  Code Status:  Full Code    Subjective:     C/C:   Chief Complaint   Patient presents with    Rectal Bleeding     Interval History:     -Vitals stable.  -Hb stabilized: 10.6 today, hematocrit 31.3. BUN:Cr ratio improved further: 13:0.8.  -GI on board.  -Clinically, has no complaints.    Brief History:     The patient is a 64 y.o. female who presented 01/17/2025 with painless bright red bleeding per rectum for which she was admitted.     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     The patient was in her usual state of health when 01/16/2025, in the evening, the patient had 5 bloody stools and noticed small back pain that was new for her; she was also lightheaded. She denies any such episodes before.     In the ER, hemoglobin was 12.1, cr 0.8, BUN 32. CTA abdomen and pelvis with contrast showed no evidence for active GI bleed.  She was given IV fluids and admitted with the plan for possible endoscopy next day.    Medications:     Allergies:  No Known Allergies    Current Meds:   Scheduled Meds:    [Held by provider] lisinopril  20 mg Oral Daily    sodium chloride flush  5-40 mL IntraVENous 2 times per day    pantoprazole (PROTONIX) 40 mg in sodium chloride (PF) 0.9 % 10 mL injection  40 mg IntraVENous Daily     Continuous Infusions:    sodium chloride       PRN Meds: sodium chloride flush, sodium chloride flush, sodium chloride, potassium chloride **OR** potassium alternative oral replacement **OR** potassium chloride, magnesium sulfate, ondansetron **OR** ondansetron,

## 2025-01-19 NOTE — PROGRESS NOTES
Physical Therapy        Physical Therapy Cancel Note      DATE: 2025    NAME: Betsy Wells  MRN: [id number]   : 1960      Patient not seen this date for Physical Therapy due to:    Patient independent with functional mobility. Will defer PT evaluation at this time. Please reorder PT if future needs arise.       Electronically signed by Danelle Kamara PT on 2025 at 9:59 AM

## 2025-01-19 NOTE — PLAN OF CARE
Problem: Discharge Planning  Goal: Discharge to home or other facility with appropriate resources  1/19/2025 1556 by Mandy Farias RN  Outcome: Adequate for Discharge     Problem: Safety - Adult  Goal: Free from fall injury  1/19/2025 1556 by Mandy Farias RN  Outcome: Adequate for Discharge     Problem: Gastrointestinal - Adult  Goal: Maintains or returns to baseline bowel function  1/19/2025 1556 by Mandy Farias RN  Outcome: Adequate for Discharge     Problem: Pain  Goal: Verbalizes/displays adequate comfort level or baseline comfort level  1/19/2025 1556 by Mandy Farias RN  Outcome: Adequate for Discharge

## 2025-01-19 NOTE — DISCHARGE SUMMARY
[facility]   IN-PATIENT SERVICE   [facility]    Discharge Summary     Patient ID: Betsy Wells  :  1960   MRN: [id number]     ACCOUNT:  [id number]   Patient's PCP: Anca Rangel APRN - CNP  Admit Date: 2025   Discharge Date: 2025     Length of Stay: 2  Code Status:  Full Code  Admitting Physician: Cary Jason MD  Discharge Physician: Hernan Gomez MD     Active Discharge Diagnoses:       Primary Problem  Rectal bleeding      Hospital Problems  Active Hospital Problems    Diagnosis Date Noted    Rectal bleeding [K62.5] 2025       Admission Condition:  good     Discharged Condition: good    Hospital Stay:       Hospital Course: The patient is a 64 y.o. female who presented 2025 with painless bright red bleeding per rectum for which she was admitted.     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     The patient was in her usual state of health when 2025, in the evening, the patient had 5 bloody stools and noticed small back pain that was new for her; she was also lightheaded. She denies any such episodes before.     In the ER, hemoglobin was 12.1, cr 0.8, BUN 32. CTA abdomen and pelvis with contrast showed no evidence for active GI bleed.  She was given IV fluids and admitted with the plan for possible endoscopy next day. Hb 10.5 next day, and platoed at that level. No further bleeding. Colonoscopy performed -- internal hemorrhoids and erosions in the terminal ileus, biopsy obtained.Cleared for discharge.    Significant therapeutic interventions: AS above    Significant Diagnostic Studies:   Labs / Micro:  CBC:   Lab Results   Component Value Date/Time    WBC 6.5 2025 04:00 AM    RBC 3.61 2025 04:00 AM    HGB 10.9 2025 12:06 PM    HCT 31.9 2025 12:06 PM    MCV 86.5 2025 04:00 AM     blood products or intraluminal contrast.  Diverticulosis. No free air, ascites or lymphadenopathy. No acute findings identified in the pelvis. No acute osseous abnormality or acute soft tissue abnormality identified.     1. No CT evidence for active GI bleed. 2. Diverticulosis. 3. Hepatic steatosis with benign liver cysts again noted. 4. Benign left adrenal adenoma again demonstrated.         Consultations:    Consults:     Final Specialist Recommendations/Findings:   IP CONSULT TO GI  IP CONSULT TO SOCIAL WORK      The patient was seen and examined on day of discharge and this discharge summary is in conjunction with any daily progress note from day of discharge.    Discharge plan:       Disposition: Home    Physician Follow Up:     Anca Rangel APRN - VÍCTOR  [address] [address]  [phone]    Schedule an appointment as soon as possible for a visit in 1 month(s)      Anca Rangel APRN - CNP  [address] [facility] [address]  [phone]          Pretty Schilling MD  [address] [facility]  [address]  [facility] [address]  [phone]    Schedule an appointment as soon as possible for a visit in 1 month(s)         Requiring Further Evaluation/Follow Up POST HOSPITALIZATION/Incidental Findings: As above    Diet: regular diet    Activity: As tolerated    Instructions to Patient:   -If you begin to experience any symptoms such as chest pain, shortness of breath, nausea, vomiting, dizziness, drowsiness, abdominal pain, loss of consciousness, or any other symptoms you find concerning please return to the ED for follow-up evaluation.  -If you have been given medication please take them as prescribed. Do not take more medication than recommended at any given time.   -Please follow-up with your primary care provider within 1 month for continued care.   -Please feel free return to the hospital if your symptoms worsen or any new concerning symptoms develop.  Follow-up with your primary care physician as needed for all

## 2025-01-19 NOTE — CARE COORDINATION
ONGOING DISCHARGE PLAN:    Patient was out of room at the time of visit.     Writer reviewed chart notes.     Pt. Is from Home w/ .     VNS has Been denied.     Pt. Had Colonoscopy w/ BX today w/ GI.     HGB today 10.1.     Will continue to follow for additional discharge needs.    If patient is discharged prior to next notation, then this note serves as note for discharge by case management.    Electronically signed by Martha Ibrahim RN on 1/19/2025 at 2:54 PM

## 2025-01-19 NOTE — PLAN OF CARE
Problem: Discharge Planning  Goal: Discharge to home or other facility with appropriate resources  1/19/2025 0430 by Gabino Gaines, RN  Outcome: Progressing     Problem: Safety - Adult  Goal: Free from fall injury  1/19/2025 0430 by Gabino Gaines, RN  Outcome: Progressing  Note: No falls noted this shift. Patient ambulates independently without difficulty.  Bed kept in low position. Safe environment maintained. Bedside table & call light in reach. Uses call light appropriately when needing assistance.       Problem: Gastrointestinal - Adult  Goal: Maintains or returns to baseline bowel function  1/19/2025 0430 by Gabino Gaines, RN  Outcome: Progressing     Problem: Pain  Goal: Verbalizes/displays adequate comfort level or baseline comfort level  Outcome: Progressing

## 2025-01-20 ENCOUNTER — CARE COORDINATION (OUTPATIENT)
Dept: CARE COORDINATION | Age: 65
End: 2025-01-20

## 2025-01-20 DIAGNOSIS — K62.5 RECTAL BLEEDING: Primary | ICD-10-CM

## 2025-01-20 LAB
ANA SER QL IA: NEGATIVE
DSDNA IGG SER QL IA: <0.5 IU/ML
MITOCHONDRIA M2 IGG SER-ACNC: 5.5 U/ML (ref 0–4)
NUCLEAR IGG SER IA-RTO: 0.2 U/ML

## 2025-01-20 PROCEDURE — 1111F DSCHRG MED/CURRENT MED MERGE: CPT | Performed by: NURSE PRACTITIONER

## 2025-01-20 NOTE — CARE COORDINATION
Per CTN request, writer called [facility] for 2 week f/u appointment post hospital discharge  from [facility] on 1.19.2025 and spoke to Chaparrita.  Chaparrita transferred me to nurse line and I was prompted to leave a message.  Provided patient demographics with phone number to contact patient for f/u appointment for 2 weeks. Will advise CTN  CC f/u 1.28

## 2025-01-20 NOTE — CARE COORDINATION
Expressed if weather too bad on Wednesday can get labs done another day, not imperative that it gets done on the 22nd but to have done sometime this week, advised she can go to any [facility] lab.    Discharge instructions reviewed, 1111F order entered.  Patient had several medications taken off her list including her eye drops for glaucoma.  Expressed she can discuss with PCP but advised that she can continue to take this now.  Other medications were either supplements or OTC medication for pain.  Advised to avoid the Exedrin and the Ibuprofen for now until she discussed with PCP.  Only med that I confirmed for her to take were her eye drops for her glaucoma. No available appts in schedule within 7 days so will route to office.  [facility].  She denies any other needs or concerns at this time.   Care Transition Nurse reviewed discharge instructions with patient. The patient was given an opportunity to ask questions; all questions answered at this time.. The patient verbalized understanding.   Were discharge instructions available to patient? Yes.   Reviewed appropriate site of care based on symptoms and resources available to patient including: PCP  Specialist. The patient agrees to contact the primary care provider and/or specialist office for questions related to their healthcare.      Advance Care Planning:   Does patient have an Advance Directive: reviewed and current.    Medication Reconciliation:  Medication reconciliation was performed with patient,1111F entered: yes.     Remote Patient Monitoring:  Offered patient enrollment in the Remote Patient Monitoring (RPM) program for in-home monitoring: Patient is not eligible for RPM program because: patient does not have qualifying diagnosis.    Assessments:  Care Transitions 24 Hour Call    Schedule Follow Up Appointment with PCP: Completed  Do you have a copy of your discharge instructions?: Yes  Do you have all of your

## 2025-01-22 ENCOUNTER — TELEPHONE (OUTPATIENT)
Dept: INTERNAL MEDICINE CLINIC | Age: 65
End: 2025-01-22

## 2025-01-22 DIAGNOSIS — E78.2 MIXED HYPERLIPIDEMIA: ICD-10-CM

## 2025-01-22 DIAGNOSIS — E78.5 HYPERLIPIDEMIA, UNSPECIFIED HYPERLIPIDEMIA TYPE: Primary | ICD-10-CM

## 2025-01-22 LAB — SURGICAL PATHOLOGY REPORT: NORMAL

## 2025-01-22 NOTE — TELEPHONE ENCOUNTER
Patient needs a new lipid order placed as the lab told her the last one . Patient would like to get this done before her appt in March    Please advise

## 2025-01-22 NOTE — TELEPHONE ENCOUNTER
Patient informed. She would like you to review her hospital discharge papers. Her papers state to stop taking all of her vitamins and she was never told why she was to stop taking them, please advise.

## 2025-01-23 ENCOUNTER — HOSPITAL ENCOUNTER (OUTPATIENT)
Age: 65
Setting detail: SPECIMEN
Discharge: HOME | End: 2025-01-23

## 2025-01-23 DIAGNOSIS — K62.5 RECTAL BLEEDING: ICD-10-CM

## 2025-01-23 DIAGNOSIS — E78.2 MIXED HYPERLIPIDEMIA: ICD-10-CM

## 2025-01-23 LAB
ANION GAP SERPL CALCULATED.3IONS-SCNC: 11 MMOL/L (ref 9–16)
BASOPHILS # BLD: 0.03 K/UL (ref 0–0.2)
BASOPHILS NFR BLD: 0 % (ref 0–2)
BUN SERPL-MCNC: 23 MG/DL (ref 8–23)
CALCIUM SERPL-MCNC: 9.4 MG/DL (ref 8.6–10.4)
CHLORIDE SERPL-SCNC: 104 MMOL/L (ref 98–107)
CHOLEST SERPL-MCNC: 258 MG/DL (ref 0–199)
CHOLESTEROL/HDL RATIO: 4.8
CO2 SERPL-SCNC: 26 MMOL/L (ref 20–31)
CREAT SERPL-MCNC: 0.8 MG/DL (ref 0.6–0.9)
EOSINOPHIL # BLD: 0.17 K/UL (ref 0–0.44)
EOSINOPHILS RELATIVE PERCENT: 2 % (ref 1–4)
ERYTHROCYTE [DISTWIDTH] IN BLOOD BY AUTOMATED COUNT: 13.5 % (ref 11.8–14.4)
GFR, ESTIMATED: 82 ML/MIN/1.73M2
GLUCOSE SERPL-MCNC: 96 MG/DL (ref 74–99)
HCT VFR BLD AUTO: 34.3 % (ref 36.3–47.1)
HDLC SERPL-MCNC: 54 MG/DL
HGB BLD-MCNC: 11.1 G/DL (ref 11.9–15.1)
IMM GRANULOCYTES # BLD AUTO: <0.03 K/UL (ref 0–0.3)
IMM GRANULOCYTES NFR BLD: 0 %
LDLC SERPL CALC-MCNC: 170 MG/DL (ref 0–100)
LYMPHOCYTES NFR BLD: 1.99 K/UL (ref 1.1–3.7)
LYMPHOCYTES RELATIVE PERCENT: 28 % (ref 24–43)
MCH RBC QN AUTO: 28.2 PG (ref 25.2–33.5)
MCHC RBC AUTO-ENTMCNC: 32.4 G/DL (ref 28.4–34.8)
MCV RBC AUTO: 87.3 FL (ref 82.6–102.9)
MONOCYTES NFR BLD: 0.61 K/UL (ref 0.1–1.2)
MONOCYTES NFR BLD: 9 % (ref 3–12)
NEUTROPHILS NFR BLD: 61 % (ref 36–65)
NEUTS SEG NFR BLD: 4.18 K/UL (ref 1.5–8.1)
NRBC BLD-RTO: 0 PER 100 WBC
PLATELET # BLD AUTO: 317 K/UL (ref 138–453)
PMV BLD AUTO: 10.1 FL (ref 8.1–13.5)
POTASSIUM SERPL-SCNC: 4.3 MMOL/L (ref 3.7–5.3)
RBC # BLD AUTO: 3.93 M/UL (ref 3.95–5.11)
SODIUM SERPL-SCNC: 141 MMOL/L (ref 136–145)
TRIGL SERPL-MCNC: 171 MG/DL
VLDLC SERPL CALC-MCNC: 34 MG/DL (ref 1–30)
WBC OTHER # BLD: 7 K/UL (ref 3.5–11.3)

## 2025-01-27 ENCOUNTER — CARE COORDINATION (OUTPATIENT)
Dept: CARE COORDINATION | Age: 65
End: 2025-01-27

## 2025-01-27 NOTE — CARE COORDINATION
Care Transitions Note    Follow Up Call     Patient Current Location:  Home: [address] [address] [facility] [address]    Care Transition Nurse contacted the patient by telephone. Verified name and  as identifiers.    Additional needs identified to be addressed with provider   No needs identified                 Method of communication with provider: none.    Care Summary Note: Was able to contact Angelica for transitional outreach.  She stated that she was doing \"fine\".  She denied any rectal bleeding, has a little dizziness, no N/V, abdominal pain, no shortness of breath, no chest pain and her back pain was getting better.  She did have her lab work done and her Hg went up to 11.1 for 10.9.  She has an appointment with GI on  to discuss biopsy results.  She had no further questions or concerns.     Plan of care updates since last contact:  Lab work/GI bleed       Advance Care Planning:   Does patient have an Advance Directive: reviewed during previous call, see note. .    Medication Review:  No changes since last call.     Remote Patient Monitoring:  Offered patient enrollment in the Remote Patient Monitoring (RPM) program for in-home monitoring: Patient is not eligible for RPM program because: patient does not have qualifying diagnosis.    Assessments:  Care Transitions Subsequent and Final Call    Subsequent and Final Calls  Do you have any ongoing symptoms?: No  Have your medications changed?: No  Do you have any questions related to your medications?: No  Do you currently have any active services?: No  Do you have any needs or concerns that I can assist you with?: No  Care Transitions Interventions  Other Interventions:              Follow Up Appointment:   Reviewed upcoming appointment(s).  Future Appointments         Provider Specialty Dept Phone    2025 2:45 PM Pretty Schilling MD Gastroenterology [phone]    2025 9:00 AM [facility] Radiology [phone]    3/3/2025 11:00 AM Norma Caldwell

## 2025-01-28 NOTE — TELEPHONE ENCOUNTER
Hospital discharge note reviewed. I do not see any mention of stopping the vitamins, so I'm not sure what the rationale was. She can bring in the vitamins/supplements to next visit and we can go through.

## 2025-01-28 NOTE — TELEPHONE ENCOUNTER
Patient informed and she states she will also follow up with the GI doctor next week about the vitamins.

## 2025-02-04 ENCOUNTER — OFFICE VISIT (OUTPATIENT)
Dept: GASTROENTEROLOGY | Age: 65
End: 2025-02-04
Payer: MEDICARE

## 2025-02-04 VITALS
BODY MASS INDEX: 39.21 KG/M2 | HEIGHT: 66 IN | SYSTOLIC BLOOD PRESSURE: 113 MMHG | DIASTOLIC BLOOD PRESSURE: 71 MMHG | WEIGHT: 244 LBS | HEART RATE: 91 BPM

## 2025-02-04 DIAGNOSIS — K62.5 RECTAL BLEEDING: Primary | ICD-10-CM

## 2025-02-04 PROCEDURE — 1036F TOBACCO NON-USER: CPT | Performed by: INTERNAL MEDICINE

## 2025-02-04 PROCEDURE — G8417 CALC BMI ABV UP PARAM F/U: HCPCS | Performed by: INTERNAL MEDICINE

## 2025-02-04 PROCEDURE — 1111F DSCHRG MED/CURRENT MED MERGE: CPT | Performed by: INTERNAL MEDICINE

## 2025-02-04 PROCEDURE — G8427 DOCREV CUR MEDS BY ELIG CLIN: HCPCS | Performed by: INTERNAL MEDICINE

## 2025-02-04 PROCEDURE — 1090F PRES/ABSN URINE INCON ASSESS: CPT | Performed by: INTERNAL MEDICINE

## 2025-02-04 PROCEDURE — 99213 OFFICE O/P EST LOW 20 MIN: CPT | Performed by: INTERNAL MEDICINE

## 2025-02-04 PROCEDURE — G8399 PT W/DXA RESULTS DOCUMENT: HCPCS | Performed by: INTERNAL MEDICINE

## 2025-02-04 PROCEDURE — 3017F COLORECTAL CA SCREEN DOC REV: CPT | Performed by: INTERNAL MEDICINE

## 2025-02-04 PROCEDURE — 1123F ACP DISCUSS/DSCN MKR DOCD: CPT | Performed by: INTERNAL MEDICINE

## 2025-02-04 NOTE — PROGRESS NOTES
GI CLINIC FOLLOW UP    INTERVAL HISTORY:   No referring provider defined for this encounter.    Chief Complaint   Patient presents with    Follow-up     Procedure follow up.           HISTORY OF PRESENT ILLNESS: Ms.Kimberly Wells is a 64 y.o. female , referred for evaluation of bleeding CT.    She was seen for c/o- bleeding CT and we did colonoscopy which showed internal hemorrhoids.  She is doing well. Her bleeding has resolved.    No c/o- nausea, vomiting, fever, loss of appetite, weight loss, bloating, bleeding CT, diarrhea, nocturnal diarrhea, tenesmus      Past Medical,Family, and Social History reviewed and does not contribute to the patient presentingcondition.    I did review all the labs results available for the labs which were ordered by the primary care physician, and the other consultants, we search on [employer] at [facility] and all the available care everywhere epic    I did review all the imaging studies of the abdomen available on EMR, ordered by the primary care physician and the other consultant    I did review all the pathology from the biopsies done on the previous endoscopies    Patient's PMH/PSH,SH,PSYCH Hx, MEDs, ALLERGIES, and ROS were all reviewed and updated in the appropriate sections.    PAST MEDICAL HISTORY:  Past Medical History:   Diagnosis Date    Abnormal Pap smear of cervix     Long long ago    Abnormal uterine bleeding (AUB)     Endocrine cancer (HCC)     Hyperlipidemia     Hypertension     Squamous cell cancer of skin of upper arm     follows with derm in [place]    Under care of service provider 04/15/2024    pcp-leonela [facility] [phone]-last visit feb 2024    Under care of service provider 04/15/2024    [phone][facility]last visit march 2024    Under care of service provider 04/15/2024    oncology ob/gyn--jose/qykiet-[phone]-last visit march 2024       Past Surgical History:   Procedure Laterality Date    BREAST

## 2025-02-05 ENCOUNTER — CARE COORDINATION (OUTPATIENT)
Dept: CARE COORDINATION | Age: 65
End: 2025-02-05

## 2025-02-05 NOTE — CARE COORDINATION
Care Transitions Note    Follow Up Call     Attempted to reach patient for transitions of care follow up.  Unable to reach patient.      Outreach Attempts:# 1  HIPAA compliant voicemail left for patient.     Care Summary Note: 1st attempt     Follow Up Appointment:   Future Appointments         Provider Specialty Dept Phone    2/14/2025 9:00 AM [facility] Radiology [phone]    3/3/2025 11:00 AM Norma Caldwell PA-C Gynecologic Oncology [phone]    3/11/2025 10:30 AM Anca Rangel, APRN - CNP Internal Medicine [phone]            Plan for follow-up on next business day.  based on severity of symptoms and risk factors. Plan for next call: GI bleed and s/s of anemia can end if doing well  follow-up appointment-with GI         Yocasta Bañuelos LPN

## 2025-02-06 ENCOUNTER — CARE COORDINATION (OUTPATIENT)
Dept: CARE COORDINATION | Age: 65
End: 2025-02-06

## 2025-02-06 NOTE — CARE COORDINATION
Care Transitions Note    Final Call     Patient Current Location:  Home: [address] [address] [address]    Care Transition Nurse contacted the patient by telephone. Verified name and  as identifiers.    Patient graduated from the Care Transitions program on 25.  Patient/family verbalizes confidence in the ability to self-manage at this time..      Advance Care Planning:   Does patient have an Advance Directive: reviewed during previous call, see note. .    Handoff:   Patient was not referred to the ACM team due to no additional needs identified.       Care Summary Note: Was able to contact Angelica for final outreach.  She stated that she was doing \"fine\".  She denied any further rectal bleeding, and that she was getting her strength back.  She did attend the GI appointment.  No new medications.  She did have a questions about her hospital stay and stated that her insurance denied her hospital stay.  Explained that possibly calling the Billing dept since they would know about coding and her admission.  Phone number given.  She had no further questions or concerns.     Assessments:  Care Transitions Subsequent and Final Call    Subsequent and Final Calls  Do you have any ongoing symptoms?: No  Have your medications changed?: No  Do you have any questions related to your medications?: No  Do you currently have any active services?: No  Do you have any needs or concerns that I can assist you with?: No  Care Transitions Interventions  Other Interventions:              Upcoming Appointments:    Future Appointments         Provider Specialty Dept Phone    2025 9:00 AM [facility] Radiology [phone]    3/3/2025 11:00 AM Norma Caldwell PA-C Gynecologic Oncology [phone]    3/11/2025 10:30 AM Anca Rangel, APRN - CNP Internal Medicine [phone]            Patient has agreed to contact primary care provider and/or specialist for any further questions, concerns, or needs.    BLUE SCHREIBER,

## 2025-02-10 ENCOUNTER — PATIENT MESSAGE (OUTPATIENT)
Dept: INTERNAL MEDICINE CLINIC | Age: 65
End: 2025-02-10

## 2025-02-14 ENCOUNTER — HOSPITAL ENCOUNTER (OUTPATIENT)
Dept: WOMENS IMAGING | Age: 65
Discharge: HOME | End: 2025-02-16
Payer: MEDICARE

## 2025-02-14 DIAGNOSIS — Z12.31 ENCOUNTER FOR SCREENING MAMMOGRAM FOR BREAST CANCER: ICD-10-CM

## 2025-02-14 PROCEDURE — 77067 SCR MAMMO BI INCL CAD: CPT

## 2025-03-06 ENCOUNTER — OFFICE VISIT (OUTPATIENT)
Dept: GYNECOLOGIC ONCOLOGY | Age: 65
End: 2025-03-06
Payer: MEDICARE

## 2025-03-06 VITALS
DIASTOLIC BLOOD PRESSURE: 88 MMHG | HEART RATE: 81 BPM | BODY MASS INDEX: 38.25 KG/M2 | SYSTOLIC BLOOD PRESSURE: 148 MMHG | TEMPERATURE: 97.6 F | OXYGEN SATURATION: 96 % | HEIGHT: 66 IN | WEIGHT: 238 LBS

## 2025-03-06 DIAGNOSIS — C54.1 ENDOMETRIAL CANCER (HCC): Primary | ICD-10-CM

## 2025-03-06 PROCEDURE — 99214 OFFICE O/P EST MOD 30 MIN: CPT | Performed by: PHYSICIAN ASSISTANT

## 2025-03-06 PROCEDURE — 1090F PRES/ABSN URINE INCON ASSESS: CPT | Performed by: PHYSICIAN ASSISTANT

## 2025-03-06 PROCEDURE — 1159F MED LIST DOCD IN RCRD: CPT | Performed by: PHYSICIAN ASSISTANT

## 2025-03-06 PROCEDURE — G8427 DOCREV CUR MEDS BY ELIG CLIN: HCPCS | Performed by: PHYSICIAN ASSISTANT

## 2025-03-06 PROCEDURE — 3017F COLORECTAL CA SCREEN DOC REV: CPT | Performed by: PHYSICIAN ASSISTANT

## 2025-03-06 PROCEDURE — 1123F ACP DISCUSS/DSCN MKR DOCD: CPT | Performed by: PHYSICIAN ASSISTANT

## 2025-03-06 PROCEDURE — 1126F AMNT PAIN NOTED NONE PRSNT: CPT | Performed by: PHYSICIAN ASSISTANT

## 2025-03-06 PROCEDURE — 1160F RVW MEDS BY RX/DR IN RCRD: CPT | Performed by: PHYSICIAN ASSISTANT

## 2025-03-06 PROCEDURE — 1036F TOBACCO NON-USER: CPT | Performed by: PHYSICIAN ASSISTANT

## 2025-03-06 PROCEDURE — G8417 CALC BMI ABV UP PARAM F/U: HCPCS | Performed by: PHYSICIAN ASSISTANT

## 2025-03-06 PROCEDURE — G8399 PT W/DXA RESULTS DOCUMENT: HCPCS | Performed by: PHYSICIAN ASSISTANT

## 2025-03-06 RX ORDER — LATANOPROST 50 UG/ML
SOLUTION/ DROPS OPHTHALMIC
COMMUNITY
Start: 2025-02-22

## 2025-03-06 ASSESSMENT — ENCOUNTER SYMPTOMS
EYES NEGATIVE: 1
GASTROINTESTINAL NEGATIVE: 1
COUGH: 1

## 2025-03-06 NOTE — PROGRESS NOTES
Review of Systems   Constitutional:  Positive for appetite change (decreased).   HENT:  Negative.     Eyes: Negative.    Respiratory:  Positive for cough.    Cardiovascular: Negative.    Gastrointestinal: Negative.    Endocrine: Negative.    Genitourinary: Negative.     Musculoskeletal: Negative.    Skin: Negative.    Neurological: Negative.    Hematological: Negative.       
murmurs or gallops appreciated.    Abdomen: Soft, non tender. No detectable organomegaly. No palpable masses or ascites. No inguinal lymphadenopathy bilaterally.    Extremities: No edema, calf tenderness or deformities bilaterally    Pelvic: The patient has normal female external genitalia including, vulva, urethra, vagina, perineum, Bartholin glands. Uterus, bilateral fallopian tubes and adnexae, and cervix are surgically absent.     Speculum examination reveals no blood or discharge in vaginal vault. Moderate cystocele and rectocele present. Vaginal cuff well-intact with no signs of erythema, induration, separation, or granulation tissue.     Bimanual examination: Bladder is non-tender, without mass. There are no palpable vaginal nodules and no other pelvic masses, tenderness or pathology noted.         Assessment/Plan:   Cancer Staging   Endometrial cancer (HCC)  Staging form: Corpus Uteri - Carcinoma And Carcinosarcoma, AJCC 8th Edition  - Clinical stage from 4/24/2024: FIGO Stage IA (cT1a, cM0) - Signed by Callie Gagnon APRN - CNP on 5/13/2024    Impression: No clinical evidence of recurrence or metastasis    Her Surveillance plan is as follows:   Return to clinic in 6 months  Call or return to clinic sooner with any questions or concerns    I spent 35 minutes providing care to the patient, counseling and coordinating care, discussing the patient's current situation, reviewing her options, counseling and education her and answering her questions.  The patient's pertinent images, labs, and previous records and procedures were reviewed.    Electronically signed by Norma Caldwell PA-C on 3/6/25 at 2:33 PM

## 2025-03-10 SDOH — HEALTH STABILITY: PHYSICAL HEALTH: ON AVERAGE, HOW MANY DAYS PER WEEK DO YOU ENGAGE IN MODERATE TO STRENUOUS EXERCISE (LIKE A BRISK WALK)?: 2 DAYS

## 2025-03-10 SDOH — HEALTH STABILITY: PHYSICAL HEALTH: ON AVERAGE, HOW MANY MINUTES DO YOU ENGAGE IN EXERCISE AT THIS LEVEL?: 30 MIN

## 2025-03-10 ASSESSMENT — PATIENT HEALTH QUESTIONNAIRE - PHQ9
SUM OF ALL RESPONSES TO PHQ QUESTIONS 1-9: 0
1. LITTLE INTEREST OR PLEASURE IN DOING THINGS: NOT AT ALL
2. FEELING DOWN, DEPRESSED OR HOPELESS: NOT AT ALL

## 2025-03-10 ASSESSMENT — LIFESTYLE VARIABLES
HOW MANY STANDARD DRINKS CONTAINING ALCOHOL DO YOU HAVE ON A TYPICAL DAY: 0
HOW OFTEN DO YOU HAVE A DRINK CONTAINING ALCOHOL: MONTHLY OR LESS
HOW OFTEN DO YOU HAVE SIX OR MORE DRINKS ON ONE OCCASION: 1
HOW OFTEN DO YOU HAVE A DRINK CONTAINING ALCOHOL: 2
HOW MANY STANDARD DRINKS CONTAINING ALCOHOL DO YOU HAVE ON A TYPICAL DAY: PATIENT DOES NOT DRINK

## 2025-03-11 ENCOUNTER — OFFICE VISIT (OUTPATIENT)
Dept: INTERNAL MEDICINE CLINIC | Age: 65
End: 2025-03-11
Payer: MEDICARE

## 2025-03-11 VITALS
DIASTOLIC BLOOD PRESSURE: 82 MMHG | HEIGHT: 66 IN | SYSTOLIC BLOOD PRESSURE: 126 MMHG | OXYGEN SATURATION: 98 % | BODY MASS INDEX: 38.57 KG/M2 | HEART RATE: 97 BPM | WEIGHT: 240 LBS

## 2025-03-11 DIAGNOSIS — E66.9 OBESITY (BMI 30-39.9): ICD-10-CM

## 2025-03-11 DIAGNOSIS — Z00.00 INITIAL MEDICARE ANNUAL WELLNESS VISIT: Primary | ICD-10-CM

## 2025-03-11 DIAGNOSIS — E78.5 HYPERLIPIDEMIA LDL GOAL <100: ICD-10-CM

## 2025-03-11 PROCEDURE — 1159F MED LIST DOCD IN RCRD: CPT | Performed by: NURSE PRACTITIONER

## 2025-03-11 PROCEDURE — 1123F ACP DISCUSS/DSCN MKR DOCD: CPT | Performed by: NURSE PRACTITIONER

## 2025-03-11 PROCEDURE — 1160F RVW MEDS BY RX/DR IN RCRD: CPT | Performed by: NURSE PRACTITIONER

## 2025-03-11 PROCEDURE — 3017F COLORECTAL CA SCREEN DOC REV: CPT | Performed by: NURSE PRACTITIONER

## 2025-03-11 PROCEDURE — G0438 PPPS, INITIAL VISIT: HCPCS | Performed by: NURSE PRACTITIONER

## 2025-03-11 NOTE — PROGRESS NOTES
erythema  Head: normocephalic and atraumatic  Eyes: pupils equal, round, and reactive to light, extraocular eye movements intact, conjunctivae normal  ENT: tympanic membrane, external ear and ear canal normal bilaterally, nose without deformity, nasal mucosa and turbinates normal without polyps  Neck: supple and non-tender without mass, no thyromegaly or thyroid nodules, no cervical lymphadenopathy  Pulmonary/Chest: clear to auscultation bilaterally- no wheezes, rales or rhonchi, normal air movement, no respiratory distress  Cardiovascular: normal rate, regular rhythm, normal S1 and S2, no murmurs, rubs, clicks, or gallops, distal pulses intact, no carotid bruits  Abdomen: soft, non-tender, non-distended, normal bowel sounds, no masses or organomegaly  Extremities: no cyanosis, clubbing or edema  Musculoskeletal: normal range of motion, no joint swelling, deformity or tenderness  Neurologic: reflexes normal and symmetric, no cranial nerve deficit, gait, coordination and speech normal          No Known Allergies  Prior to Visit Medications    Medication Sig Taking? Authorizing Provider   latanoprost (XALATAN) 0.005 % ophthalmic solution  Yes Provider, MD Gianni   lisinopril (PRINIVIL;ZESTRIL) 20 MG tablet Take 1 tablet by mouth daily Yes Anca Rangel APRN - CNP       CareTeam (Including outside providers/suppliers regularly involved in providing care):   Patient Care Team:  Anca Rangel APRN - CNP as PCP - General (Internal Medicine)  Anca Rangel APRN - CNP as PCP - Empaneled Provider     Recommendations for Preventive Services Due: see orders and patient instructions/AVS.  Recommended screening schedule for the next 5-10 years is provided to the patient in written form: see Patient Instructions/AVS.     Reviewed and updated this visit:  Allergies  Meds  Problems  Sexual Hx

## 2025-03-11 NOTE — PATIENT INSTRUCTIONS
Learning About Being Active as an Older Adult  Why is being active important as you get older?     Being active is one of the best things you can do for your health. And it's never too late to start. Being active--or getting active, if you aren't already--has definite benefits. It can:  Give you more energy,  Keep your mind sharp.  Improve balance to reduce your risk of falls.  Help you manage chronic illness with fewer medicines.  No matter how old you are, how fit you are, or what health problems you have, there is a form of activity that will work for you. And the more physical activity you can do, the better your overall health will be.  What kinds of activity can help you stay healthy?  Being more active will make your daily activities easier. Physical activity includes planned exercise and things you do in daily life. There are four types of activity:  Aerobic.  Doing aerobic activity makes your heart and lungs strong.  Includes walking, dancing, and gardening.  Aim for at least 2½ hours spread throughout the week.  It improves your energy and can help you sleep better.  Muscle-strengthening.  This type of activity can help maintain muscle and strengthen bones.  Includes climbing stairs, using resistance bands, and lifting or carrying heavy loads.  Aim for at least twice a week.  It can help protect the knees and other joints.  Stretching.  Stretching gives you better range of motion in joints and muscles.  Includes upper arm stretches, calf stretches, and gentle yoga.  Aim for at least twice a week, preferably after your muscles are warmed up from other activities.  It can help you function better in daily life.  Balancing.  This helps you stay coordinated and have good posture.  Includes heel-to-toe walking, chepe chi, and certain types of yoga.  Aim for at least 3 days a week.  It can reduce your risk of falling.  Even if you have a hard time meeting the recommendations, it's better to be more active

## 2025-03-19 ENCOUNTER — HOSPITAL ENCOUNTER (OUTPATIENT)
Dept: CT IMAGING | Age: 65
Discharge: HOME | End: 2025-03-21
Payer: MEDICARE

## 2025-03-19 DIAGNOSIS — E78.5 HYPERLIPIDEMIA LDL GOAL <100: ICD-10-CM

## 2025-03-19 PROCEDURE — 75571 CT HRT W/O DYE W/CA TEST: CPT

## 2025-05-08 ENCOUNTER — TELEPHONE (OUTPATIENT)
Dept: INTERNAL MEDICINE CLINIC | Age: 65
End: 2025-05-08

## 2025-05-08 DIAGNOSIS — Z01.00 ENCOUNTER FOR COMPLETE EYE EXAM: Primary | ICD-10-CM

## 2025-05-08 NOTE — TELEPHONE ENCOUNTER
Dr. Ngoc Hernandez with [facility] called stating they need a referral faxed for glaucoma exam.    Patient is scheduled on Monday    Please advise

## 2025-05-08 NOTE — TELEPHONE ENCOUNTER
Orders Placed This Encounter   Procedures    External Referral To Ophthalmology     Referral Priority:   Routine     Referral Type:   Eval and Treat     Referral Reason:   Specialty Services Required     Requested Specialty:   Ophthalmology     Number of Visits Requested:   1

## 2025-05-09 ENCOUNTER — TELEPHONE (OUTPATIENT)
Dept: INTERNAL MEDICINE CLINIC | Age: 65
End: 2025-05-09

## 2025-05-09 DIAGNOSIS — I10 HYPERTENSION GOAL BP (BLOOD PRESSURE) < 130/80: ICD-10-CM

## 2025-05-09 RX ORDER — LISINOPRIL 20 MG/1
20 TABLET ORAL DAILY
Qty: 90 TABLET | Refills: 1 | Status: SHIPPED | OUTPATIENT
Start: 2025-05-09 | End: 2025-05-13 | Stop reason: SDUPTHER

## 2025-05-09 NOTE — TELEPHONE ENCOUNTER
Patient is wanting to know if Anca will give her a written prescription for her Lisinopril to take on her 5 month trip to [place], please advise.

## 2025-05-09 NOTE — TELEPHONE ENCOUNTER
Patient informed that they are closed today and I do not have a fax number. She informed me her appt is at 10 AM Monday and I will fax it before her appt on Monday when I can speak to the office.

## 2025-05-09 NOTE — TELEPHONE ENCOUNTER
Yes, printed.  Orders Placed This Encounter   Medications    lisinopril (PRINIVIL;ZESTRIL) 20 MG tablet     Sig: Take 1 tablet by mouth daily     Dispense:  90 tablet     Refill:  1

## 2025-05-13 RX ORDER — LISINOPRIL 20 MG/1
20 TABLET ORAL DAILY
Qty: 90 TABLET | Refills: 1 | Status: SHIPPED | OUTPATIENT
Start: 2025-05-13

## 2025-05-13 RX ORDER — LISINOPRIL 20 MG/1
20 TABLET ORAL DAILY
Qty: 90 TABLET | Refills: 1 | Status: CANCELLED | OUTPATIENT
Start: 2025-05-13

## 2025-05-14 DIAGNOSIS — I10 HYPERTENSION GOAL BP (BLOOD PRESSURE) < 130/80: ICD-10-CM

## 2025-05-14 RX ORDER — LISINOPRIL 20 MG/1
20 TABLET ORAL DAILY
Qty: 90 TABLET | Refills: 1 | OUTPATIENT
Start: 2025-05-14

## 2025-08-24 DIAGNOSIS — I10 HYPERTENSION GOAL BP (BLOOD PRESSURE) < 130/80: ICD-10-CM

## 2025-08-26 RX ORDER — LISINOPRIL 20 MG/1
20 TABLET ORAL DAILY
Qty: 90 TABLET | Refills: 1 | Status: SHIPPED | OUTPATIENT
Start: 2025-08-26

## (undated) DEVICE — SOLUTION IRRIG 3000ML 0.9% SOD CHL USP UROMATIC PLAS CONT

## (undated) DEVICE — Z DISCONTINUED USE 2220241 SUTURE SZ 0 27IN 5/8 CIR UR-6  TAPER PT VIOLET ABSRB VICRYL J603H

## (undated) DEVICE — GLOVE ORANGE PI 7   MSG9070

## (undated) DEVICE — ELECTRO LUBE IS A SINGLE PATIENT USE DEVICE THAT IS INTENDED TO BE USED ON ELECTROSURGICAL ELECTRODES TO REDUCE STICKING.: Brand: KEY SURGICAL ELECTRO LUBE

## (undated) DEVICE — COVER,LIGHT HANDLE,FLX,2/PK: Brand: MEDLINE INDUSTRIES, INC.

## (undated) DEVICE — TOWEL,OR,DSP,ST,NATURAL,DLX,4/PK,20PK/CS: Brand: MEDLINE

## (undated) DEVICE — STRAP,POSITIONING,KNEE/BODY,FOAM,4X60": Brand: MEDLINE

## (undated) DEVICE — CYSTO/BLADDER IRRIGATION SET, REGULATING CLAMP

## (undated) DEVICE — COUNTER NDL 10 COUNT HLD 20 FOAM BLK SGL MAG

## (undated) DEVICE — PAD,NON-ADHERENT,3X8,STERILE,LF,1/PK: Brand: MEDLINE

## (undated) DEVICE — DRAPE,REIN 53X77,STERILE: Brand: MEDLINE

## (undated) DEVICE — GOWN,SIRUS,NONRNF,SETINSLV,XL,20/CS: Brand: MEDLINE

## (undated) DEVICE — SOLUTION IRRIG 1000ML STRL H2O USP PLAS POUR BTL

## (undated) DEVICE — DEVICE TRCR 12X9X3IN WHT CLSR DISP OMNICLOSE

## (undated) DEVICE — ST CHARLES PERI-GYN: Brand: MEDLINE INDUSTRIES, INC.

## (undated) DEVICE — CONTAINER,SPECIMEN,4OZ,OR STRL: Brand: MEDLINE

## (undated) DEVICE — SYRINGE MED 5ML STD CLR PLAS LUERLOCK TIP N CTRL DISP

## (undated) DEVICE — SVMMC GYN ROBOTIC PK

## (undated) DEVICE — APPLICATOR MEDICATED 26 CC SOLUTION HI LT ORNG CHLORAPREP

## (undated) DEVICE — ARM DRAPE

## (undated) DEVICE — Device: Brand: NAKAO QUICKTRAP

## (undated) DEVICE — SUTURE MONOCRYL SZ 4-0 L18IN ABSRB UD L16MM PC-3 3/8 CIR PRIM Y845G

## (undated) DEVICE — GARMENT,MEDLINE,DVT,INT,CALF,MED, GEN2: Brand: MEDLINE

## (undated) DEVICE — ENDO KIT W/SYRINGE: Brand: MEDLINE INDUSTRIES, INC.

## (undated) DEVICE — 1LYRTR 16FR10ML100%SIL UMS SNP: Brand: MEDLINE INDUSTRIES, INC.

## (undated) DEVICE — DEFENDO AIR WATER SUCTION AND BIOPSY VALVE KIT FOR  OLYMPUS: Brand: DEFENDO AIR/WATER/SUCTION AND BIOPSY VALVE

## (undated) DEVICE — SUTURE STRATAFIX SPRL PDS + SZ 0 L9IN ABSRB VLT CT-1 L36MM SXPP1B455

## (undated) DEVICE — SINGLE PORT MANIFOLD: Brand: NEPTUNE 2

## (undated) DEVICE — PROTECTOR ULN NRV PUR FOAM HK LOOP STRP ANATOMICALLY

## (undated) DEVICE — TROCAR: Brand: KII FIOS FIRST ENTRY

## (undated) DEVICE — TRI-LUMEN FILTERED TUBE SET WITH ACTIVATED CHARCOAL FILTER: Brand: AIRSEAL

## (undated) DEVICE — DRAPE,UNDRBUT,WHT GRAD PCH,CAPPORT,20/CS: Brand: MEDLINE

## (undated) DEVICE — SEAL

## (undated) DEVICE — SUTURE PROL SZ 2-0 L18IN NONABSORBABLE BLU FS L26MM 3/8 CIR 8685H

## (undated) DEVICE — VCARE LARGE, UTERINE MANIPULATOR, VAGINAL-CERVICAL-AHLUWALIA'S-RETRACTOR-ELEVATOR: Brand: VCARE

## (undated) DEVICE — TIP COVER ACCESSORY

## (undated) DEVICE — 3M™ IOBAN™ 2 ANTIMICROBIAL INCISE DRAPE 6650EZ: Brand: IOBAN™ 2

## (undated) DEVICE — AIRSEAL 12 MM ACCESS PORT AND PALM GRIP OBTURATOR WITH BLADELESS OPTICAL TIP, 120 MM LENGTH: Brand: AIRSEAL

## (undated) DEVICE — DRAIN SURG 19FR 100% SIL RADPQ RND CHN FULL FLUT

## (undated) DEVICE — RESERVOIR,SUCTION,100CC,SILICONE: Brand: MEDLINE

## (undated) DEVICE — FORCEPS BX L240CM JAW DIA2.8MM L CAP W/ NDL MIC MESH TOOTH

## (undated) DEVICE — GLOVE ORTHO 7 1/2   MSG9475

## (undated) DEVICE — SOLUTION ANTIFOG VIS SYS CLEARIFY LAPSCP

## (undated) DEVICE — COVER OR TBL W40XL90IN ABSRB STD AND GRIPPY BK SAHARA

## (undated) DEVICE — SET,IRRIGATION,CYSTO/TUR,90": Brand: MEDLINE

## (undated) DEVICE — PAD PT POS 36 IN SURGYPAD DISP

## (undated) DEVICE — SET ENDOSCP SEAL HYSTEROSCOPE RIG OUTFLO CHN DISP MYOSURE

## (undated) DEVICE — DEVICE TISS REM DIA3MM L25.25IN ENDOSCP F/ IU POLYPS

## (undated) DEVICE — 3000ML,PRESSURE INFUSER W/STOPCOCK: Brand: MEDLINE